# Patient Record
Sex: FEMALE | Race: WHITE | NOT HISPANIC OR LATINO
[De-identification: names, ages, dates, MRNs, and addresses within clinical notes are randomized per-mention and may not be internally consistent; named-entity substitution may affect disease eponyms.]

---

## 2022-07-07 ENCOUNTER — NON-APPOINTMENT (OUTPATIENT)
Age: 32
End: 2022-07-07

## 2022-07-08 ENCOUNTER — APPOINTMENT (OUTPATIENT)
Dept: INTERNAL MEDICINE | Facility: CLINIC | Age: 32
End: 2022-07-08

## 2022-07-08 ENCOUNTER — TRANSCRIPTION ENCOUNTER (OUTPATIENT)
Age: 32
End: 2022-07-08

## 2022-07-08 ENCOUNTER — LABORATORY RESULT (OUTPATIENT)
Age: 32
End: 2022-07-08

## 2022-07-08 VITALS
DIASTOLIC BLOOD PRESSURE: 68 MMHG | OXYGEN SATURATION: 99 % | BODY MASS INDEX: 23.57 KG/M2 | SYSTOLIC BLOOD PRESSURE: 101 MMHG | TEMPERATURE: 99 F | HEART RATE: 68 BPM | WEIGHT: 133 LBS | HEIGHT: 63 IN

## 2022-07-08 DIAGNOSIS — R10.9 UNSPECIFIED ABDOMINAL PAIN: ICD-10-CM

## 2022-07-08 DIAGNOSIS — Z00.00 ENCOUNTER FOR GENERAL ADULT MEDICAL EXAMINATION W/OUT ABNORMAL FINDINGS: ICD-10-CM

## 2022-07-08 PROCEDURE — 99385 PREV VISIT NEW AGE 18-39: CPT | Mod: 25

## 2022-07-08 PROCEDURE — 36415 COLL VENOUS BLD VENIPUNCTURE: CPT

## 2022-07-17 LAB
APPEARANCE: ABNORMAL
BILIRUBIN URINE: NEGATIVE
BLOOD URINE: NEGATIVE
COLOR: YELLOW
GLUCOSE QUALITATIVE U: NEGATIVE
HIV1+2 AB SPEC QL IA.RAPID: NONREACTIVE
KETONES URINE: NEGATIVE
LEUKOCYTE ESTERASE URINE: NEGATIVE
N GONORRHOEA RRNA SPEC QL NAA+PROBE: NOT DETECTED
NITRITE URINE: NEGATIVE
PH URINE: 8
PROTEIN URINE: ABNORMAL
SOURCE AMPLIFICATION: NORMAL
SPECIFIC GRAVITY URINE: 1.02
UROBILINOGEN URINE: NORMAL

## 2023-11-06 ENCOUNTER — HOSPITAL ENCOUNTER (EMERGENCY)
Facility: CLINIC | Age: 33
Discharge: HOME | End: 2023-11-06
Attending: EMERGENCY MEDICINE | Admitting: EMERGENCY MEDICINE
Payer: COMMERCIAL

## 2023-11-06 VITALS
HEART RATE: 82 BPM | RESPIRATION RATE: 19 BRPM | TEMPERATURE: 98.3 F | OXYGEN SATURATION: 99 % | SYSTOLIC BLOOD PRESSURE: 128 MMHG | DIASTOLIC BLOOD PRESSURE: 80 MMHG

## 2023-11-06 DIAGNOSIS — M43.6 NECK STIFFNESS: ICD-10-CM

## 2023-11-06 LAB
ANION GAP SERPL CALCULATED.3IONS-SCNC: 11 MMOL/L (ref 7–15)
BASOPHILS # BLD AUTO: 0.2 10E3/UL (ref 0–0.2)
BASOPHILS NFR BLD AUTO: 2 %
BUN SERPL-MCNC: 7 MG/DL (ref 6–20)
CALCIUM SERPL-MCNC: 9.5 MG/DL (ref 8.6–10)
CHLORIDE SERPL-SCNC: 102 MMOL/L (ref 98–107)
CREAT SERPL-MCNC: 0.78 MG/DL (ref 0.51–0.95)
CRP SERPL-MCNC: <3 MG/L
DEPRECATED HCO3 PLAS-SCNC: 25 MMOL/L (ref 22–29)
EGFRCR SERPLBLD CKD-EPI 2021: >90 ML/MIN/1.73M2
EOSINOPHIL # BLD AUTO: 0.3 10E3/UL (ref 0–0.7)
EOSINOPHIL NFR BLD AUTO: 3 %
ERYTHROCYTE [DISTWIDTH] IN BLOOD BY AUTOMATED COUNT: 12.6 % (ref 10–15)
ERYTHROCYTE [SEDIMENTATION RATE] IN BLOOD BY WESTERGREN METHOD: 10 MM/HR (ref 0–20)
GLUCOSE SERPL-MCNC: 100 MG/DL (ref 70–99)
HCG SERPL QL: NEGATIVE
HCT VFR BLD AUTO: 41.2 % (ref 35–47)
HGB BLD-MCNC: 13.6 G/DL (ref 11.7–15.7)
IMM GRANULOCYTES # BLD: 0.1 10E3/UL
IMM GRANULOCYTES NFR BLD: 1 %
LYMPHOCYTES # BLD AUTO: 4 10E3/UL (ref 0.8–5.3)
LYMPHOCYTES NFR BLD AUTO: 37 %
MCH RBC QN AUTO: 31.3 PG (ref 26.5–33)
MCHC RBC AUTO-ENTMCNC: 33 G/DL (ref 31.5–36.5)
MCV RBC AUTO: 95 FL (ref 78–100)
MONOCYTES # BLD AUTO: 0.8 10E3/UL (ref 0–1.3)
MONOCYTES NFR BLD AUTO: 7 %
NEUTROPHILS # BLD AUTO: 5.4 10E3/UL (ref 1.6–8.3)
NEUTROPHILS NFR BLD AUTO: 50 %
NRBC # BLD AUTO: 0 10E3/UL
NRBC BLD AUTO-RTO: 0 /100
PLATELET # BLD AUTO: 387 10E3/UL (ref 150–450)
POTASSIUM SERPL-SCNC: 3.8 MMOL/L (ref 3.4–5.3)
RBC # BLD AUTO: 4.34 10E6/UL (ref 3.8–5.2)
SODIUM SERPL-SCNC: 138 MMOL/L (ref 135–145)
WBC # BLD AUTO: 10.8 10E3/UL (ref 4–11)

## 2023-11-06 PROCEDURE — 85025 COMPLETE CBC W/AUTO DIFF WBC: CPT | Performed by: EMERGENCY MEDICINE

## 2023-11-06 PROCEDURE — 36415 COLL VENOUS BLD VENIPUNCTURE: CPT | Performed by: EMERGENCY MEDICINE

## 2023-11-06 PROCEDURE — 84703 CHORIONIC GONADOTROPIN ASSAY: CPT | Performed by: EMERGENCY MEDICINE

## 2023-11-06 PROCEDURE — 85652 RBC SED RATE AUTOMATED: CPT | Performed by: EMERGENCY MEDICINE

## 2023-11-06 PROCEDURE — 99284 EMERGENCY DEPT VISIT MOD MDM: CPT | Performed by: EMERGENCY MEDICINE

## 2023-11-06 PROCEDURE — 99283 EMERGENCY DEPT VISIT LOW MDM: CPT | Performed by: EMERGENCY MEDICINE

## 2023-11-06 PROCEDURE — 80048 BASIC METABOLIC PNL TOTAL CA: CPT | Performed by: EMERGENCY MEDICINE

## 2023-11-06 PROCEDURE — 86140 C-REACTIVE PROTEIN: CPT | Performed by: EMERGENCY MEDICINE

## 2023-11-06 RX ORDER — CYCLOBENZAPRINE HCL 10 MG
10 TABLET ORAL 3 TIMES DAILY PRN
Qty: 20 TABLET | Refills: 0 | Status: SHIPPED | OUTPATIENT
Start: 2023-11-06 | End: 2023-11-13

## 2023-11-06 ASSESSMENT — ACTIVITIES OF DAILY LIVING (ADL)
ADLS_ACUITY_SCORE: 33
ADLS_ACUITY_SCORE: 33

## 2023-11-07 NOTE — ED PROVIDER NOTES
ED Provider Note  [facility]      History     Chief Complaint   Patient presents with    Neck Pain     1 week ago onset of neck pain. Does have full ROM in neck with pain. Denies specific injury. Now has URI symptoms. Pt mentions in triage that she is concerned she could have meningitis.     HPI  Sanjuanita Fry is a 33 year old female who presents with 1 week of neck pain that impacts her paraspinal trapezius muscles in her cervical neck extending up towards her occiput. She does not remember any trauma, whiplash, sleeping in a weird way, or any other inciting event. She denies weakness or numbness. She has felt that she maybe had a fever once in the last week but not measured and not recurrent, and then she had a brief area of erythematous raised rash on her right arm that self resolved after a day. Due to ongoing neck pain for a week with a brief rash and possible fever, she presents with concern for possible meningitis. She denies any vision changes, weakness, numbness, severe headache, or other acute complaints at this time.     Past Medical History  History reviewed. No pertinent past medical history.  History reviewed. No pertinent surgical history.  cyclobenzaprine (FLEXERIL) 10 MG tablet  levonorgestrel (MIRENA) 52 MG (20 mcg/day) IUD      Not on File  Family History  No family history on file.  Social History       Past medical history, past surgical history, medications, allergies, family history, and social history were reviewed with the patient. No additional pertinent items.      A complete review of systems was performed with pertinent positives and negatives noted in the HPI, and all other systems negative.    Physical Exam   BP: 137/78  Pulse: 80  Temp: 98.3  F (36.8  C)  Resp: 20  SpO2: 98 %  Physical Exam  Vitals reviewed.   Constitutional:       Appearance: Normal appearance.   HENT:      Head: Normocephalic and atraumatic.      Mouth/Throat:      Mouth: Mucous  membranes are moist.      Pharynx: Oropharynx is clear.   Eyes:      Extraocular Movements: Extraocular movements intact.      Conjunctiva/sclera: Conjunctivae normal.      Pupils: Pupils are equal, round, and reactive to light.   Cardiovascular:      Rate and Rhythm: Normal rate and regular rhythm.   Pulmonary:      Effort: Pulmonary effort is normal. No respiratory distress.   Abdominal:      Palpations: Abdomen is soft.      Tenderness: There is no abdominal tenderness.   Musculoskeletal:         General: Normal range of motion.      Cervical back: Normal range of motion and neck supple.   Skin:     General: Skin is warm and dry.   Neurological:      General: No focal deficit present.      Mental Status: She is alert and oriented to person, place, and time.      Sensory: No sensory deficit.      Motor: No weakness.      Gait: Gait normal.   Psychiatric:         Mood and Affect: Mood normal.         Behavior: Behavior normal.           ED Course, Procedures, & Data      Procedures                     Results for orders placed or performed during the hospital encounter of 11/06/23   Basic metabolic panel     Status: Abnormal   Result Value Ref Range    Sodium 138 135 - 145 mmol/L    Potassium 3.8 3.4 - 5.3 mmol/L    Chloride 102 98 - 107 mmol/L    Carbon Dioxide (CO2) 25 22 - 29 mmol/L    Anion Gap 11 7 - 15 mmol/L    Urea Nitrogen 7.0 6.0 - 20.0 mg/dL    Creatinine 0.78 0.51 - 0.95 mg/dL    GFR Estimate >90 >60 mL/min/1.73m2    Calcium 9.5 8.6 - 10.0 mg/dL    Glucose 100 (H) 70 - 99 mg/dL   CRP inflammation     Status: Normal   Result Value Ref Range    CRP Inflammation <3.00 <5.00 mg/L   Erythrocyte sedimentation rate auto     Status: Normal   Result Value Ref Range    Erythrocyte Sedimentation Rate 10 0 - 20 mm/hr   HCG qualitative Blood     Status: Normal   Result Value Ref Range    hCG Serum Qualitative Negative Negative   CBC with platelets and differential     Status: None   Result Value Ref Range    WBC  Count 10.8 4.0 - 11.0 10e3/uL    RBC Count 4.34 3.80 - 5.20 10e6/uL    Hemoglobin 13.6 11.7 - 15.7 g/dL    Hematocrit 41.2 35.0 - 47.0 %    MCV 95 78 - 100 fL    MCH 31.3 26.5 - 33.0 pg    MCHC 33.0 31.5 - 36.5 g/dL    RDW 12.6 10.0 - 15.0 %    Platelet Count 387 150 - 450 10e3/uL    % Neutrophils 50 %    % Lymphocytes 37 %    % Monocytes 7 %    % Eosinophils 3 %    % Basophils 2 %    % Immature Granulocytes 1 %    NRBCs per 100 WBC 0 <1 /100    Absolute Neutrophils 5.4 1.6 - 8.3 10e3/uL    Absolute Lymphocytes 4.0 0.8 - 5.3 10e3/uL    Absolute Monocytes 0.8 0.0 - 1.3 10e3/uL    Absolute Eosinophils 0.3 0.0 - 0.7 10e3/uL    Absolute Basophils 0.2 0.0 - 0.2 10e3/uL    Absolute Immature Granulocytes 0.1 <=0.4 10e3/uL    Absolute NRBCs 0.0 10e3/uL   CBC with platelets differential     Status: None    Narrative    The following orders were created for panel order CBC with platelets differential.  Procedure                               Abnormality         Status                     ---------                               -----------         ------                     CBC with platelets and d...[[id number]]                      Final result                 Please view results for these tests on the individual orders.     Medications - No data to display  Labs Ordered and Resulted from Time of ED Arrival to Time of ED Departure   BASIC METABOLIC PANEL - Abnormal       Result Value    Sodium 138      Potassium 3.8      Chloride 102      Carbon Dioxide (CO2) 25      Anion Gap 11      Urea Nitrogen 7.0      Creatinine 0.78      GFR Estimate >90      Calcium 9.5      Glucose 100 (*)    CRP INFLAMMATION - Normal    CRP Inflammation <3.00     ERYTHROCYTE SEDIMENTATION RATE AUTO - Normal    Erythrocyte Sedimentation Rate 10     HCG QUALITATIVE PREGNANCY - Normal    hCG Serum Qualitative Negative     CBC WITH PLATELETS AND DIFFERENTIAL    WBC Count 10.8      RBC Count 4.34      Hemoglobin 13.6      Hematocrit 41.2      MCV 95       MCH 31.3      MCHC 33.0      RDW 12.6      Platelet Count 387      % Neutrophils 50      % Lymphocytes 37      % Monocytes 7      % Eosinophils 3      % Basophils 2      % Immature Granulocytes 1      NRBCs per 100 WBC 0      Absolute Neutrophils 5.4      Absolute Lymphocytes 4.0      Absolute Monocytes 0.8      Absolute Eosinophils 0.3      Absolute Basophils 0.2      Absolute Immature Granulocytes 0.1      Absolute NRBCs 0.0       No orders to display          Critical care was not performed.     Medical Decision Making  The patient's presentation was of moderate complexity (an undiagnosed new problem with uncertain diagnosis).    The patient's evaluation involved:  ordering and/or review of 3+ test(s) in this encounter (see separate area of note for details)    The patient's management necessitated only low risk treatment.    Assessment & Plan       Sanjuanita Fry is a 33 year old female who presents with 1 week of neck pain that impacts her paraspinal trapezius muscles in her cervical neck extending up towards her occiput.  Patient is nontoxic on exam, his vital signs within normal limits including normal temperature at 98.3 Fahrenheit without use of antipyretics.  She has no meningismus on exam.  Clinically this is unlikely to represent meningitis and I discussed with patient that I do not think lumbar puncture would be warranted at this time when considering low pretest probability, and risk to benefit ratio of LP in the setting of a low pretest probability.  Patient expressed understanding.  We did draw basic labs including pregnancy test and inflammatory markers, as well as CBC, all of which were unremarkable.  On exam patient's pain is at her lower occiput and worse with moving neck left and right. No pain with chin-to-chest or true meningismus.  No brudzinski or Kernig sign.  No neuro deficits by history or on exam to suggest meningitis or cervical radiculopathy or cervical cord pathology.     Overall  patient with a reassuring evaluation. Suspect musculoskeletal neck pain and patient was prescribed flexeril PRN. Discharged with outpatient follow up and return precautions.     I have reviewed the nursing notes. I have reviewed the findings, diagnosis, plan and need for follow up with the patient.    Discharge Medication List as of 11/6/2023 10:41 PM        START taking these medications    Details   cyclobenzaprine (FLEXERIL) 10 MG tablet Take 1 tablet (10 mg) by mouth 3 times daily as needed for muscle spasms or other (neck pain), Disp-20 tablet, R-0, Local Print             Final diagnoses:   Neck stiffness       Tapan Gardiner MD  [facility] EMERGENCY DEPARTMENT  11/6/2023     Tapan Gardiner MD  11/08/23 0100

## 2023-11-07 NOTE — ED NOTES
Pt received discharge paperwork and script to take home, pt had no further questions for the RN of MD, vitals taken, pt ambulated out of the ED, pt stated they were able to get a ride to take them home

## 2024-07-09 ENCOUNTER — OFFICE VISIT (OUTPATIENT)
Dept: FAMILY MEDICINE | Facility: CLINIC | Age: 34
End: 2024-07-09
Payer: COMMERCIAL

## 2024-07-09 VITALS
WEIGHT: 132 LBS | HEART RATE: 63 BPM | BODY MASS INDEX: 23.39 KG/M2 | OXYGEN SATURATION: 99 % | TEMPERATURE: 98.2 F | SYSTOLIC BLOOD PRESSURE: 105 MMHG | DIASTOLIC BLOOD PRESSURE: 69 MMHG | HEIGHT: 63 IN

## 2024-07-09 DIAGNOSIS — Z00.00 PREVENTATIVE HEALTH CARE: Primary | ICD-10-CM

## 2024-07-09 DIAGNOSIS — Z31.69 PRE-CONCEPTION COUNSELING: ICD-10-CM

## 2024-07-09 SDOH — HEALTH STABILITY: PHYSICAL HEALTH: ON AVERAGE, HOW MANY MINUTES DO YOU ENGAGE IN EXERCISE AT THIS LEVEL?: 30 MIN

## 2024-07-09 SDOH — HEALTH STABILITY: PHYSICAL HEALTH: ON AVERAGE, HOW MANY DAYS PER WEEK DO YOU ENGAGE IN MODERATE TO STRENUOUS EXERCISE (LIKE A BRISK WALK)?: 5 DAYS

## 2024-07-09 ASSESSMENT — SOCIAL DETERMINANTS OF HEALTH (SDOH): HOW OFTEN DO YOU GET TOGETHER WITH FRIENDS OR RELATIVES?: THREE TIMES A WEEK

## 2024-07-09 NOTE — PATIENT INSTRUCTIONS
There is no perfect time for a baby     One example  If the the goal is April baby  - pull the IUD about Dec  - start prenatal in April  - stop using condoms late June       But also - could just a prenatal .And pull the IUD       When you pull the IUD, your periods are most likely to be irregular.  Okay to conceive then - but if you don't get a period, take a pregnancy test every month

## 2024-07-09 NOTE — PROGRESS NOTES
Preventive Care Visit  [facility]  Leslye Rosado MD, Family Medicine  Jul 9, 2024      Assessment & Plan   Sanjuanita Fry is a 33 year old year old female who presents to clinic today to establish care and for a physical.    Preventative health care  Will get outside records.  Most likely due for pap smear - also considering IUD removal - so would remove IUD and do pap smear at same visit.  Commended healthy diet and exercise.    Preconception counseling  - start prenatal vitamin ideally 3-4 months before trying to conceive. Discussed timing and general health, as well as early pregnancy recommendations         Subjective   Sanjuanita is a 33 year old, presenting for the following:  Establish Care and Physical       Health Care Directive  Patient does not have a Health Care Directive or Living Will: Discussed advance care planning with patient; information given to patient to review.    HPI    Generally healthy, no concerns, establishing care   - last pap about 3 years ago    Pregnancy planning  - Mirena placed either 2020 or 2021  - newly , thinking about timing of pregnancies             7/9/2024   General Health   How would you rate your overall physical health? Excellent   Feel stress (tense, anxious, or unable to sleep) Not at all            7/9/2024   Nutrition   Three or more servings of calcium each day? Yes   Diet: Regular (no restrictions)   How many servings of fruit and vegetables per day? 4 or more   How many sweetened beverages each day? 0-1            7/9/2024   Exercise   Days per week of moderate/strenous exercise 5 days   Average minutes spent exercising at this level 30 min            7/9/2024   Social Factors   Frequency of gathering with friends or relatives Three times a week   Worry food won't last until get money to buy more No    No   Food not last or not have enough money for food? No    No   Do you have housing? (Housing is defined as stable permanent housing and does not  include staying ouside in a car, in a tent, in an abandoned building, in an overnight shelter, or couch-surfing.) Yes    Yes   Are you worried about losing your housing? No    No   Lack of transportation? No    No   Unable to get utilities (heat,electricity)? No    No       Multiple values from one day are sorted in reverse-chronological order         7/9/2024   Dental   Dentist two times every year? Yes            7/9/2024   TB Screening   Were you born outside of the [place]? No            Today's PHQ-2 Score:       7/9/2024     1:56 PM   PHQ-2 ( 1999 [employer])   Q1: Little interest or pleasure in doing things 0   Q2: Feeling down, depressed or hopeless 0   PHQ-2 Score 0   Q1: Little interest or pleasure in doing things Not at all   Q2: Feeling down, depressed or hopeless Not at all   PHQ-2 Score 0           7/9/2024   Substance Use   Alcohol more than 3/day or more than 7/wk No   Do you use any other substances recreationally? (!) ALCOHOL        Social History     Tobacco Use    Smoking status: Never    Smokeless tobacco: Never   Substance Use Topics    Alcohol use: Yes     Alcohol/week: 1.0 - 3.0 standard drink of alcohol     Types: 1 - 3 Standard drinks or equivalent per week                 7/9/2024   One time HIV Screening   Previous HIV test? Yes          7/9/2024   STI Screening   New sexual partner(s) since last STI/HIV test? No        History of abnormal Pap smear: No - age 30- 64 PAP with HPV every 5 years recommended             7/9/2024   Contraception/Family Planning   Questions about contraception or family planning (!) YES see HPI        Reviewed and updated as needed this visit by Provider                    History reviewed. No pertinent past medical history.    Past Surgical History:   Procedure Laterality Date    WISDOM TOOTH EXTRACTION       Family History   Problem Relation Age of Onset    Hypertension Mother     Heart Disease Father     Diabetes Type 2  Father     Glaucoma Father     Diabetes Type 2  " Maternal Grandmother     Hypertension Maternal Grandmother     Prostate Cancer Maternal Grandfather     Emphysema Paternal Grandmother     Breast Cancer Maternal Aunt         30s    Pancreatic Cancer Maternal Aunt     Other - See Comments Maternal Aunt         something precancerous in uterus            Objective    Exam  /69   Pulse 63   Temp 98.2  F (36.8  C)   Ht 1.61 m (5' 3.39\")   Wt 59.9 kg (132 lb)   SpO2 99%   BMI 23.10 kg/m     Estimated body mass index is 23.1 kg/m  as calculated from the following:    Height as of this encounter: 1.61 m (5' 3.39\").    Weight as of this encounter: 59.9 kg (132 lb).    Physical Exam  GENERAL: alert and no distress  RESP: lungs clear to auscultation - no rales, rhonchi or wheezes  CV: regular rate and rhythm, normal S1 S2, no S3 or S4, no murmur, click or rub, no peripheral edema  MS: no gross musculoskeletal defects noted, no edema        Signed Electronically by: Leslye Rosado MD    "

## 2024-07-17 ENCOUNTER — OFFICE VISIT (OUTPATIENT)
Dept: MIDWIFE SERVICES | Facility: CLINIC | Age: 34
End: 2024-07-17
Payer: COMMERCIAL

## 2024-07-17 VITALS
HEART RATE: 78 BPM | BODY MASS INDEX: 23.39 KG/M2 | SYSTOLIC BLOOD PRESSURE: 112 MMHG | OXYGEN SATURATION: 100 % | DIASTOLIC BLOOD PRESSURE: 72 MMHG | HEIGHT: 63 IN | WEIGHT: 132 LBS

## 2024-07-17 DIAGNOSIS — Z12.4 SCREENING FOR MALIGNANT NEOPLASM OF CERVIX: ICD-10-CM

## 2024-07-17 DIAGNOSIS — Z11.8 SPECIAL SCREENING EXAMINATION FOR CHLAMYDIAL DISEASE: ICD-10-CM

## 2024-07-17 DIAGNOSIS — Z11.3 SCREENING FOR GONORRHEA: ICD-10-CM

## 2024-07-17 DIAGNOSIS — Z01.419 ENCOUNTER FOR GYNECOLOGICAL EXAMINATION WITHOUT ABNORMAL FINDING: Primary | ICD-10-CM

## 2024-07-17 PROCEDURE — 99459 PELVIC EXAMINATION: CPT | Performed by: ADVANCED PRACTICE MIDWIFE

## 2024-07-17 PROCEDURE — 87591 N.GONORRHOEAE DNA AMP PROB: CPT | Performed by: ADVANCED PRACTICE MIDWIFE

## 2024-07-17 PROCEDURE — 99203 OFFICE O/P NEW LOW 30 MIN: CPT | Performed by: ADVANCED PRACTICE MIDWIFE

## 2024-07-17 PROCEDURE — G0145 SCR C/V CYTO,THINLAYER,RESCR: HCPCS | Performed by: ADVANCED PRACTICE MIDWIFE

## 2024-07-17 PROCEDURE — 87491 CHLMYD TRACH DNA AMP PROBE: CPT | Performed by: ADVANCED PRACTICE MIDWIFE

## 2024-07-17 PROCEDURE — 87624 HPV HI-RISK TYP POOLED RSLT: CPT | Performed by: ADVANCED PRACTICE MIDWIFE

## 2024-07-17 NOTE — PROGRESS NOTES
"S: Sanjuanita is a nulliparous woman, here for her PAP smear. She had a recent annual exam, but is considering pregnancy in the next year or so, so wanted to establish with an OB/GYN clinic as well. She has an IUD in place now, and is not considering removal at this time. No history of abnormal PAPs.     O:/72   Pulse 78   Ht 1.61 m (5' 3.39\")   Wt 59.9 kg (132 lb)   SpO2 100%   BMI 23.10 kg/m    Exam:  Constitutional: healthy, alert, and no distress  Psychiatric: mentation appears normal and affect normal/bright  Pelvic Exam:  Vulva: No external lesions, normal hair distribution, no adenopathy  Vagina: Moist, pink, no abnormal discharge, well rugated, no lesions  Cervix: Pap smear is taken, parous, smooth, pink, no visible lesions; IUD strings NOT visualized.  GC/CT: pending    A/P:  (Z01.419) Encounter for gynecological examination without abnormal finding  (primary encounter diagnosis)    (Z12.4) Screening for malignant neoplasm of cervix  Plan: Gynecologic Cytology (Pap) and HPV -         Recommended Age 30-65 Years    (Z11.3) Screening for gonorrhea  Plan: Chlamydia & Gonorrhea by PCR, [facility] -         Clinic Collect    (Z11.8) Special screening examination for chlamydial disease  Plan: Chlamydia & Gonorrhea by PCR, [facility] -         Clinic Collect    Discussed that it is hard to know when her cycle will return after removal of her IUD.  Shown pregnancy wheel, and discussed how to use it to help with planning pregnancy timing.    Chepe Ryder CNM        "

## 2024-07-18 LAB
C TRACH DNA SPEC QL PROBE+SIG AMP: NEGATIVE
HPV HR 12 DNA CVX QL NAA+PROBE: NEGATIVE
HPV16 DNA CVX QL NAA+PROBE: NEGATIVE
HPV18 DNA CVX QL NAA+PROBE: NEGATIVE
HUMAN PAPILLOMA VIRUS FINAL DIAGNOSIS: NORMAL
N GONORRHOEA DNA SPEC QL NAA+PROBE: NEGATIVE

## 2024-07-23 LAB
BKR LAB AP GYN ADEQUACY: NORMAL
BKR LAB AP GYN INTERPRETATION: NORMAL
BKR LAB AP PREVIOUS ABNORMAL: NORMAL
PATH REPORT.COMMENTS IMP SPEC: NORMAL
PATH REPORT.COMMENTS IMP SPEC: NORMAL
PATH REPORT.RELEVANT HX SPEC: NORMAL

## 2024-09-01 ENCOUNTER — OFFICE VISIT (OUTPATIENT)
Dept: URGENT CARE | Facility: URGENT CARE | Age: 34
End: 2024-09-01
Payer: COMMERCIAL

## 2024-09-01 VITALS
TEMPERATURE: 99.1 F | DIASTOLIC BLOOD PRESSURE: 84 MMHG | OXYGEN SATURATION: 99 % | HEIGHT: 63 IN | HEART RATE: 72 BPM | BODY MASS INDEX: 23.92 KG/M2 | SYSTOLIC BLOOD PRESSURE: 127 MMHG | WEIGHT: 135 LBS

## 2024-09-01 DIAGNOSIS — H69.90 DYSFUNCTION OF EUSTACHIAN TUBE, UNSPECIFIED LATERALITY: Primary | ICD-10-CM

## 2024-09-01 PROCEDURE — 99203 OFFICE O/P NEW LOW 30 MIN: CPT | Performed by: FAMILY MEDICINE

## 2024-09-01 NOTE — PROGRESS NOTES
Subjective: Patient had a cold in July and ever since then has been congested in her ears and her nose, and now it is merging with her fall allergies and she is frequently uncomfortable.  She remembers having eustachian tube dysfunction when she was younger.  Incidentally she tells me that a lot of her medical records are of another patient that somehow got merged with hers and she will keep trying to get that fixed.    Objective: ENT is normal.  The TMs look perfect.  The throat is normal.  The neck is normal.    Assessment and plan: Undoubtedly this is eustachian tube dysfunction.  I think a combination of Mucinex and Flonase can help but she has to be really patient with the Flonase because it takes a few weeks before the effect hits and she needs to stick with it for a full month.  If things are not getting better, she should probably see an ENT doctor.

## 2024-09-18 ENCOUNTER — OFFICE VISIT (OUTPATIENT)
Dept: OPTOMETRY | Facility: CLINIC | Age: 34
End: 2024-09-18
Payer: COMMERCIAL

## 2024-09-18 DIAGNOSIS — H52.229 MYOPIA WITH REGULAR ASTIGMATISM: Primary | ICD-10-CM

## 2024-09-18 DIAGNOSIS — H52.10 MYOPIA WITH REGULAR ASTIGMATISM: Primary | ICD-10-CM

## 2024-09-18 ASSESSMENT — TONOMETRY
OD_IOP_MMHG: 20
OS_IOP_MMHG: 20
IOP_METHOD: ICARE

## 2024-09-18 ASSESSMENT — CONF VISUAL FIELD
OS_NORMAL: 1
OD_INFERIOR_TEMPORAL_RESTRICTION: 0
OD_INFERIOR_NASAL_RESTRICTION: 0
OS_INFERIOR_NASAL_RESTRICTION: 0
OD_SUPERIOR_NASAL_RESTRICTION: 0
OS_SUPERIOR_NASAL_RESTRICTION: 0
METHOD: COUNTING FINGERS
OD_NORMAL: 1
OS_INFERIOR_TEMPORAL_RESTRICTION: 0
OD_SUPERIOR_TEMPORAL_RESTRICTION: 0
OS_SUPERIOR_TEMPORAL_RESTRICTION: 0

## 2024-09-18 ASSESSMENT — REFRACTION_MANIFEST
OS_SPHERE: -2.25
OS_AXIS: 075
OD_SPHERE: -3.75
OS_CYLINDER: +1.25
OD_AXIS: 106
OD_CYLINDER: +0.75

## 2024-09-18 ASSESSMENT — REFRACTION_CURRENTRX
OD_DIAMETER: 14.5
OD_AXIS: 020
OD_BRAND: COOPER BIOFINITY TORIC BC 8.7, D 14.5
OD_BASECURVE: 8.7
OS_DIAMETER: 14.5
OS_SPHERE: -1.00
OS_BRAND: COOPER BIOFINITY TORIC BC 8.7, D 14.5
OD_SPHERE: -2.50
OS_BASECURVE: 8.7
OD_CYLINDER: -1.25
OS_CYLINDER: -1.25
OS_AXIS: 170

## 2024-09-18 ASSESSMENT — SLIT LAMP EXAM - LIDS
COMMENTS: NORMAL
COMMENTS: NORMAL

## 2024-09-18 ASSESSMENT — VISUAL ACUITY
CORRECTION_TYPE: CONTACTS
OS_CC+: -1
OS_CC: 20/25
METHOD: SNELLEN - LINEAR
OD_CC: 20/20

## 2024-09-18 ASSESSMENT — EXTERNAL EXAM - RIGHT EYE: OD_EXAM: NORMAL

## 2024-09-18 ASSESSMENT — REFRACTION_WEARINGRX
OD_CYLINDER: +1.25
OS_SPHERE: -2.25
OS_CYLINDER: +1.25
OD_AXIS: 110
SPECS_TYPE: SVL
OS_AXIS: 080
OD_SPHERE: -3.75

## 2024-09-18 ASSESSMENT — CUP TO DISC RATIO
OS_RATIO: 0.35
OD_RATIO: 0.35

## 2024-09-18 ASSESSMENT — EXTERNAL EXAM - LEFT EYE: OS_EXAM: NORMAL

## 2024-09-18 NOTE — PATIENT INSTRUCTIONS
Artificial tears: (Water-like consistency. Can be used during the daytime)  -Refresh Plus  -Refresh Optive  -Refresh Relieva  -Systane Ultra  -Systane Complete  -Systane Hydration  -Biotrue Hydration Boost  -Blink  (Notes: Anything in a bottle has preservatives and can be used up to 4x/day. Preservative free vials can be used as much as necessary)

## 2024-09-18 NOTE — NURSING NOTE
Chief Complaints and History of Present Illnesses   Patient presents with    Annual Eye Exam     Pt here for new adult annual eye exam.     Chief Complaint(s) and History of Present Illness(es)       Annual Eye Exam              Laterality: both eyes    Comments: Pt here for new adult annual eye exam.              Comments    Pt last eye exam was in 2022. Pt has largely unchanged vision since last exam. Needs contacts updated.     Mindy Jack, COT on 9/18/2024 at 10:55 AM

## 2024-09-18 NOTE — PROGRESS NOTES
A/P  1.) Myopia/Astigmatism OU  -Stable spec Rx, doing well in current glasses and CL's  -Occasional dryness - continue AT  -Dilated ocular health unremarkable OU    Monitor 1-2 years comprehensive, sooner prn. She would like annual supply of CL's ordered and mailed    I have confirmed the patient's CC, HPI and reviewed Past Medical History, Past Surgical History, Social History, Family History, Problem List, Medication List and agree with Tech note.     Georgina Toscano, OD FAAO [facility]SLS    Contact Lens Billing  V-Code:  - Soft toric  Final Contact Lens Rx         Brand Base Curve Diameter Sphere Cylinder Axis    Right Biofinity Toric 8.7 14.5 -2.50 -1.25 020    Left Biofinity Toric 8.7 14.5 -1.00 -1.25 170      Expiration Date: 9/18/26    Replacement: Monthly    Solutions: Multipurpose    Wearing Schedule: Daily Wear           Fait order mailed direct: [id number]  # of units: 4 boxes  Price per Unit: $75 per box  Free shipping annual supply    These are for cosmetic contact lenses.    Encounter Diagnosis   Name Primary?    Myopia with regular astigmatism Yes

## 2024-12-08 NOTE — PROGRESS NOTES
Assessment & Plan   Sanjuanita Fry is a 34 year old year old female who presents to clinic today for IUD removal, but I was unable to visualize IUD strings today for removal    Intrauterine contraceptive device threads lost, initial encounter  Referring for IUD removal with lost string   - Ob/Gyn  Referral    Encounter for preconception consultation  - discussed linking prenatal vitamin to teeth brushing or other daily activity  - discussed early pregnancy care          Subjective   Sanjuanita is a 34 year old, presenting for the following health issues:  Gyn Exam (IUD removal and Pap)    HPI   IUD removal  - desires pregnancy   - not getting a period with her IUD   - taking prenatal vitamin a few a times a week   - planning to try to conceive in the spring     Prevention  - patient had forgotten that she had her pap done last summer         Objective    /68 (BP Location: Left arm, Patient Position: Sitting, Cuff Size: Adult Regular)   Pulse 65   Temp 98.3  F (36.8  C) (Skin)   Resp 14   Wt 64 kg (141 lb)   SpO2 99%   BMI 24.98 kg/m    There is no height or weight on file to calculate BMI.  Physical Exam   GENERAL: alert and no distress   (female) normal female external genitalia, normal urethral meatus, normal vaginal mucosa, normal cervix.  IUD strings not visible.  Tried cytobrush - could not locate strings  MS: no gross musculoskeletal defects noted, no edema          Signed Electronically by: Leslye Rosado MD

## 2024-12-09 ENCOUNTER — OFFICE VISIT (OUTPATIENT)
Dept: OBGYN | Facility: CLINIC | Age: 34
End: 2024-12-09
Payer: COMMERCIAL

## 2024-12-09 ENCOUNTER — OFFICE VISIT (OUTPATIENT)
Dept: FAMILY MEDICINE | Facility: CLINIC | Age: 34
End: 2024-12-09
Payer: COMMERCIAL

## 2024-12-09 VITALS
BODY MASS INDEX: 24.98 KG/M2 | WEIGHT: 141 LBS | TEMPERATURE: 98.3 F | SYSTOLIC BLOOD PRESSURE: 111 MMHG | HEART RATE: 65 BPM | RESPIRATION RATE: 14 BRPM | DIASTOLIC BLOOD PRESSURE: 68 MMHG | OXYGEN SATURATION: 99 %

## 2024-12-09 VITALS
BODY MASS INDEX: 24.91 KG/M2 | SYSTOLIC BLOOD PRESSURE: 111 MMHG | OXYGEN SATURATION: 98 % | TEMPERATURE: 97.5 F | WEIGHT: 140.6 LBS | DIASTOLIC BLOOD PRESSURE: 68 MMHG | HEART RATE: 64 BPM

## 2024-12-09 DIAGNOSIS — Z30.432 ENCOUNTER FOR REMOVAL OF INTRAUTERINE CONTRACEPTIVE DEVICE: Primary | ICD-10-CM

## 2024-12-09 DIAGNOSIS — Z31.69 ENCOUNTER FOR PRECONCEPTION CONSULTATION: ICD-10-CM

## 2024-12-09 DIAGNOSIS — T83.32XA INTRAUTERINE CONTRACEPTIVE DEVICE THREADS LOST, INITIAL ENCOUNTER: ICD-10-CM

## 2024-12-09 DIAGNOSIS — T83.32XA INTRAUTERINE CONTRACEPTIVE DEVICE THREADS LOST, INITIAL ENCOUNTER: Primary | ICD-10-CM

## 2024-12-09 PROCEDURE — 58301 REMOVE INTRAUTERINE DEVICE: CPT | Performed by: OBSTETRICS & GYNECOLOGY

## 2024-12-09 ASSESSMENT — ANXIETY QUESTIONNAIRES
3. WORRYING TOO MUCH ABOUT DIFFERENT THINGS: NOT AT ALL
1. FEELING NERVOUS, ANXIOUS, OR ON EDGE: NOT AT ALL
6. BECOMING EASILY ANNOYED OR IRRITABLE: NOT AT ALL
2. NOT BEING ABLE TO STOP OR CONTROL WORRYING: NOT AT ALL
2. NOT BEING ABLE TO STOP OR CONTROL WORRYING: NOT AT ALL
5. BEING SO RESTLESS THAT IT IS HARD TO SIT STILL: NOT AT ALL
7. FEELING AFRAID AS IF SOMETHING AWFUL MIGHT HAPPEN: NOT AT ALL
IF YOU CHECKED OFF ANY PROBLEMS ON THIS QUESTIONNAIRE, HOW DIFFICULT HAVE THESE PROBLEMS MADE IT FOR YOU TO DO YOUR WORK, TAKE CARE OF THINGS AT HOME, OR GET ALONG WITH OTHER PEOPLE: NOT DIFFICULT AT ALL
IF YOU CHECKED OFF ANY PROBLEMS ON THIS QUESTIONNAIRE, HOW DIFFICULT HAVE THESE PROBLEMS MADE IT FOR YOU TO DO YOUR WORK, TAKE CARE OF THINGS AT HOME, OR GET ALONG WITH OTHER PEOPLE: NOT DIFFICULT AT ALL
GAD7 TOTAL SCORE: 0
1. FEELING NERVOUS, ANXIOUS, OR ON EDGE: NOT AT ALL
3. WORRYING TOO MUCH ABOUT DIFFERENT THINGS: NOT AT ALL
7. FEELING AFRAID AS IF SOMETHING AWFUL MIGHT HAPPEN: NOT AT ALL
6. BECOMING EASILY ANNOYED OR IRRITABLE: NOT AT ALL
GAD7 TOTAL SCORE: 0
5. BEING SO RESTLESS THAT IT IS HARD TO SIT STILL: NOT AT ALL
GAD7 TOTAL SCORE: 0

## 2024-12-09 ASSESSMENT — PATIENT HEALTH QUESTIONNAIRE - PHQ9
SUM OF ALL RESPONSES TO PHQ QUESTIONS 1-9: 0
5. POOR APPETITE OR OVEREATING: NOT AT ALL
5. POOR APPETITE OR OVEREATING: NOT AT ALL

## 2024-12-09 NOTE — PROGRESS NOTES
I was asked to see Sanjuanita Fry by Leslye Elizondo  for consultation regarding Lost IUD strings     HPI:  Sanjuanita Fry is a 34 year old female here for a consultation regarding: IUD removal     Was seen at the [facility] for IUD removal. They could not see the strings so she was referred here    This is the 4th yr of her second mirena IUD and she has not had a period since 2016 due to her mirena     Would just like to have the IUD removed today.   Does not want another as she is considering starting a familuy.     Relationship: Long-term, stable relationship      No LMP recorded (lmp unknown). (Menstrual status: IUD).         No past medical history on file.    Past Surgical History:   Procedure Laterality Date    WISDOM TOOTH EXTRACTION         Family History   Problem Relation Age of Onset    Hypertension Mother     Heart Disease Father     Diabetes Type 2  Father     Glaucoma Father     Diabetes Type 2  Maternal Grandmother     Hypertension Maternal Grandmother     Prostate Cancer Maternal Grandfather     Emphysema Paternal Grandmother     Breast Cancer Maternal Aunt         30s    Pancreatic Cancer Maternal Aunt     Other - See Comments Maternal Aunt         something precancerous in uterus         Medications:    Current Outpatient Medications:     Prenatal Vit-Fe Fumarate-FA (PRENATAL PO), , Disp: , Rfl:     levonorgestrel (MIRENA) 52 MG (20 mcg/day) IUD, by Intrauterine route once (Patient not taking: Reported on 12/9/2024), Disp: , Rfl:     Allergies:  Patient has no known allergies.    ROS:  She denies abnormal vaginal bleeding, discharge or unusual pelvic pain  EXAM:  /68   Pulse 64   Temp 97.5  F (36.4  C) (Temporal)   Wt 63.8 kg (140 lb 9.6 oz)   LMP  (LMP Unknown)   SpO2 98%   Breastfeeding No   BMI 24.91 kg/m      General - pleasant female in no acute distress.  Neurological - Alert and oriented  Psych:  normal mood and affect.  normal respiratory and cardiovascular  effort   Breast - deferred.  Pelvic:  EG - normal adult female.  BUS - within normal limits.  Vagina - well rugated, no discharge.  Cervix - no lesions, no CMT.    IUD strings  were not visualized.     Procedure:  After  consent was obtained from the patient, the cervix was prepped with bedadine and using the IUD forceps, the strings were grasped from up within the cervix and brought down past the external os.  The IUD did not come easily with traction using a ring forceps.  The cervix was then dilated with a disposable os finder.  After that an attempt to remove the IUD was successful.  IUD removed intact with a few seconds of moderate patient discomfort.  She tolerated it very well however a few minutes later started to feel faint.  She was given a cool pack on her forehead and called apple juice.  She felt much better after a couple minutes.  There were no complications, minimal bleeding        ASSESSMENT:/PLAN:  (Z30.432) Encounter for removal of intrauterine contraceptive device  (primary encounter diagnosis)  Comment: Removed successfully after dilating the cervix  Plan: follow-up prn     (T83.32XA) Intrauterine contraceptive device threads lost, initial encounter  Comment: IUD strings found curled up within the endocervical canal  Plan: IUD successfully removed.  Patient declines operative contraception     Tori Farrar MD

## 2025-05-28 ENCOUNTER — VIRTUAL VISIT (OUTPATIENT)
Dept: OBGYN | Facility: CLINIC | Age: 35
End: 2025-05-28

## 2025-05-28 VITALS — BODY MASS INDEX: 24.91 KG/M2 | HEIGHT: 63 IN

## 2025-05-28 DIAGNOSIS — Z23 NEED FOR DIPHTHERIA-TETANUS-PERTUSSIS (TDAP) VACCINE: ICD-10-CM

## 2025-05-28 DIAGNOSIS — Z34.00 ENCOUNTER FOR SUPERVISION OF NORMAL FIRST PREGNANCY: Primary | ICD-10-CM

## 2025-05-28 PROCEDURE — 99207 PR NO CHARGE NURSE ONLY: CPT | Mod: 93

## 2025-05-28 NOTE — PROGRESS NOTES
Important Information for Provider:     New ob nurse intake by phone, first pregnancy. Patient had IUD removed 12/2024, normal periods after removal.  Handouts reviewed. Discussed genetic screening. Medication list sent via my chart  Recommended B6, Unisom for nausea  Patient is running in [place] [employer] 6/2025. She has been a runner for years. Precautions given  Ultrasound and NOB with Cele 6/22/2025       Caffeine intake/servings daily - 1  Calcium intake/servings daily - 3  Exercise 5 times weekly - describe ; runs marathons   Sunscreen used - Yes  Seatbelts used - Yes  Self Breast Exam - Yes  Pap test up to date -  Yes  Dental exam up to date -  Yes  Immunizations reviewed and up to date - Yes  Abuse: Current or Past (Physical, Sexual or Emotional) - No  Do you feel safe in your environment - Yes  Do you cope well with stress - Yes                Prenatal OB Questionnaire  Patient supplied answers from flow sheet for:  Prenatal OB Questionnaire.  Past Medical History  Have you ever recieved care for your mental health? : (Patient-Rptd) No  Have you ever been in a major accident or suffered serious trauma?: (Patient-Rptd) No  Within the last year, has anyone hit, slapped, kicked or otherwise hurt you?: (Patient-Rptd) No  In the last year, has anyone forced you to have sex when you didn't want to?: (Patient-Rptd) No    Past Medical History 2   Have you ever received a blood transfusion?: (Patient-Rptd) No  Would you accept a blood transfusion if was medically recommended?: (Patient-Rptd) Yes  Does anyone in your home smoke?: (Patient-Rptd) No   Is your blood type Rh negative?: (Patient-Rptd) No  Have you ever ?: (Patient-Rptd) No  Have you been hospitalized for a nonsurgical reason excluding normal delivery?: (Patient-Rptd) No  Have you ever had an abnormal pap smear?: (Patient-Rptd) No    Past Medical History (Continued)  Do you have a history of abnormalities of the uterus?: (Patient-Rptd) No  Did  no your mother take PATY or any other hormones when she was pregnant with you?: (Patient-Rptd) No  Do you have any other problems we have not asked about which you feel may be important to this pregnancy?: (Patient-Rptd) No                     Allergies as of 5/28/2025:    Allergies as of 05/28/2025    (No Known Allergies)         Does patient have irregular periods?  No  Did patient use hormonal birth control in the three months prior to positive urine pregnancy test? No  Is the patient breastfeeding?  No  Is the patient 10 weeks or greater at time of education visit?  No

## 2025-06-09 ENCOUNTER — OFFICE VISIT (OUTPATIENT)
Dept: FAMILY MEDICINE | Facility: CLINIC | Age: 35
End: 2025-06-09
Payer: COMMERCIAL

## 2025-06-09 VITALS
HEART RATE: 63 BPM | TEMPERATURE: 97.9 F | HEIGHT: 63 IN | OXYGEN SATURATION: 98 % | BODY MASS INDEX: 24.45 KG/M2 | DIASTOLIC BLOOD PRESSURE: 63 MMHG | WEIGHT: 138 LBS | SYSTOLIC BLOOD PRESSURE: 101 MMHG

## 2025-06-09 DIAGNOSIS — Z34.91 NORMAL PREGNANCY, FIRST TRIMESTER: ICD-10-CM

## 2025-06-09 DIAGNOSIS — O21.9 NAUSEA AND VOMITING DURING PREGNANCY: Primary | ICD-10-CM

## 2025-06-09 RX ORDER — DOXYLAMINE SUCCINATE AND PYRIDOXINE HYDROCHLORIDE 20; 20 MG/1; MG/1
1 TABLET, EXTENDED RELEASE ORAL 2 TIMES DAILY
Qty: 120 TABLET | Refills: 2 | Status: SHIPPED | OUTPATIENT
Start: 2025-06-09

## 2025-06-09 NOTE — PATIENT INSTRUCTIONS
Ideally - doxylamine-pyridoxine ER - 1 tablet at bedtime. Can take a second one in the morning if needed    If that is expensive - buy them separately   - take unisom - doxylamine at night.  25 mg tabs. Start with 1/2.  Can increase to 1 tab     Dose - As a single agent, the typical B6 - pyridoxine dose is 10 to 25 mg orally every six to eight hours, as needed. Where the 10 mg tablet is unavailable, the 25 mg tablet can be used safely (splitting the tablet can be difficult).

## 2025-06-09 NOTE — NURSING NOTE
"Sanjuanita  34 year old    Chief Complaint   Patient presents with    Prenatal Care     Training for a marathon and would like to discuss with provider if it is safe since she is 8 weeks into pregnancy            Blood pressure 101/63, pulse 63, temperature 97.9  F (36.6  C), height 1.6 m (5' 2.99\"), weight 62.6 kg (138 lb), last menstrual period 04/16/2025, SpO2 98%, not currently breastfeeding. Body mass index is 24.45 kg/m .    Patient Active Problem List   Diagnosis    Need for diphtheria-tetanus-pertussis (Tdap) vaccine             Wt Readings from Last 2 Encounters:   06/09/25 62.6 kg (138 lb)   12/09/24 63.8 kg (140 lb 9.6 oz)       BP Readings from Last 3 Encounters:   06/09/25 101/63   12/09/24 111/68   12/09/24 111/68                Current Outpatient Medications   Medication Sig Dispense Refill    Prenatal Vit-Fe Fumarate-FA (PRENATAL PO)        No current facility-administered medications for this visit.             Social History     Tobacco Use    Smoking status: Never    Smokeless tobacco: Never   Vaping Use    Vaping status: Never Used   Substance Use Topics    Alcohol use: Not Currently     Alcohol/week: 1.0 - 3.0 standard drink of alcohol     Types: 1 - 3 Standard drinks or equivalent per week    Drug use: Never             Health Maintenance Due   Topic Date Due    HIV SCREENING  Never done    HEPATITIS C SCREENING  Never done    HEPATITIS B VACCINE (1 of 3 - 19+ 3-dose series) Never done    DTAP/TDAP/TD VACCINE (1 - Tdap) Never done    MATERNAL SCREENING DISCUSSION  06/25/2025    YEARLY PREVENTIVE VISIT  07/09/2025           No results found for: \"PAP\"           June 9, 2025 4:38 PM    "

## 2025-06-09 NOTE — PROGRESS NOTES
Assessment & Plan   Sanjuanita Fry is a 34 year old year old female who presents to clinic today 7w5d pregnant, has OB intake scheduled - and has some questions about pregnancy.      Nausea and vomiting during pregnancy  Agree with plan for small snacks/meals.  Discussed doxylamine and pyridoxine as options.  Will try prescribing combination medication.  If that is expensive - can buy it separately OTC. See AVS for details  - Doxylamine-Pyridoxine ER (BONJESTA) 20-20 MG TBCR; Take 1 tablet by mouth 2 times daily.    Normal pregnancy   - supportive visit   - patient has been able to train successfully for marathon and is feeling good when running.  Was able to complete 20 mile training run.  As long as she feels good, reasonable to complete marathon.  If she is not feeling well - then should not run. Can also bow out at 1/2 marathon or after is she starts not feeling well.   - discussed NIPT vs first trimester screening, second trimester screening labs, and 20 week anatomy scan - patient pro screening, interested in NIPT. Discussed labor pain control options. Discussed routine vaccines in pregnancy.  Reassured her OB will discuss this with her         Subjective   Sanjuanita is a 34 year old, presenting for the following health issues:  Prenatal Care (Training for a marathon and would like to discuss with provider if it is safe since she is 8 weeks into pregnancy )    HPI      Pregnancy  - 7w5d pregnant  - feeling super tired and nausea - really hard to work  - eating more snacks and fewer meals   - has ultrasound scheduled for dating, and OB intake visit  - wondering about prenatal screening, worried about pain control in labor, wondering about vaccines in pregnancy     Training for a marathon  - training for 9th marathon, last one last summer   - did 20 min training run - felt good.  Feels good running   - planning for marathon July 21, can stop after 1/2 marathon if she isn't feeling good     Soc Hx  - just  "bought a house           Objective    /63   Pulse 63   Temp 97.9  F (36.6  C)   Ht 1.6 m (5' 2.99\")   Wt 62.6 kg (138 lb)   LMP 04/16/2025   SpO2 98%   BMI 24.45 kg/m    Body mass index is 24.45 kg/m .  Physical Exam   General: Alert, pleasant and cooperative in no acute distress.    Resp: Breathing comfortably, no respiratory distress, speaking in full sentences   Skin: no jaundice or rashes on visible skin   Psych: well groomed, mood appropriate to content of speech, linear thoughts, no pressured speech        42  minutes spent on the date of the encounter doing chart review, history and exam, documentation and further activities per the note     The longitudinal plan of care for the diagnosis(es)/condition(s) as documented were addressed during this visit. Due to the added complexity in care, I will continue to support Sanjuanita in the subsequent management and with ongoing continuity of care.      Signed Electronically by: Leslye Rosado MD    "

## 2025-06-10 LAB
ABO + RH BLD: NORMAL
BLD GP AB SCN SERPL QL: NEGATIVE
SPECIMEN EXP DATE BLD: NORMAL

## 2025-06-11 ENCOUNTER — RESULTS FOLLOW-UP (OUTPATIENT)
Dept: OBGYN | Facility: CLINIC | Age: 35
End: 2025-06-11

## 2025-06-11 ENCOUNTER — TRANSCRIBE ORDERS (OUTPATIENT)
Dept: MATERNAL FETAL MEDICINE | Facility: CLINIC | Age: 35
End: 2025-06-11

## 2025-06-11 ENCOUNTER — PRENATAL OFFICE VISIT (OUTPATIENT)
Dept: OBGYN | Facility: CLINIC | Age: 35
End: 2025-06-11
Payer: COMMERCIAL

## 2025-06-11 ENCOUNTER — PATIENT OUTREACH (OUTPATIENT)
Dept: ONCOLOGY | Facility: CLINIC | Age: 35
End: 2025-06-11

## 2025-06-11 ENCOUNTER — ANCILLARY PROCEDURE (OUTPATIENT)
Dept: ULTRASOUND IMAGING | Facility: CLINIC | Age: 35
End: 2025-06-11
Payer: COMMERCIAL

## 2025-06-11 VITALS
DIASTOLIC BLOOD PRESSURE: 59 MMHG | WEIGHT: 140.7 LBS | HEART RATE: 74 BPM | OXYGEN SATURATION: 98 % | SYSTOLIC BLOOD PRESSURE: 103 MMHG | BODY MASS INDEX: 24.93 KG/M2

## 2025-06-11 DIAGNOSIS — O21.9 NAUSEA AND VOMITING DURING PREGNANCY: ICD-10-CM

## 2025-06-11 DIAGNOSIS — Z34.91 NORMAL PREGNANCY, FIRST TRIMESTER: Primary | ICD-10-CM

## 2025-06-11 DIAGNOSIS — O26.90 PREGNANCY RELATED CONDITION, ANTEPARTUM: Primary | ICD-10-CM

## 2025-06-11 DIAGNOSIS — Z34.00 ENCOUNTER FOR SUPERVISION OF NORMAL FIRST PREGNANCY: ICD-10-CM

## 2025-06-11 DIAGNOSIS — Z80.8 FAMILY HISTORY OF MALIGNANT NEOPLASM OF OTHER ORGANS OR SYSTEMS: ICD-10-CM

## 2025-06-11 LAB
ALBUMIN UR-MCNC: NEGATIVE MG/DL
APPEARANCE UR: CLEAR
BILIRUB UR QL STRIP: NEGATIVE
COLOR UR AUTO: YELLOW
ERYTHROCYTE [DISTWIDTH] IN BLOOD BY AUTOMATED COUNT: 11.9 % (ref 10–15)
EST. AVERAGE GLUCOSE BLD GHB EST-MCNC: 108 MG/DL
GLUCOSE UR STRIP-MCNC: NEGATIVE MG/DL
HBA1C MFR BLD: 5.4 % (ref 0–5.6)
HBV SURFACE AG SERPL QL IA: NONREACTIVE
HCT VFR BLD AUTO: 36.3 % (ref 35–47)
HCV AB SERPL QL IA: NONREACTIVE
HGB BLD-MCNC: 12.2 G/DL (ref 11.7–15.7)
HGB UR QL STRIP: NEGATIVE
HIV 1+2 AB+HIV1 P24 AG SERPL QL IA: NONREACTIVE
KETONES UR STRIP-MCNC: NEGATIVE MG/DL
LEUKOCYTE ESTERASE UR QL STRIP: NEGATIVE
MCH RBC QN AUTO: 31.3 PG (ref 26.5–33)
MCHC RBC AUTO-ENTMCNC: 33.6 G/DL (ref 31.5–36.5)
MCV RBC AUTO: 93 FL (ref 78–100)
NITRATE UR QL: NEGATIVE
PH UR STRIP: 6.5 [PH] (ref 5–7)
PLATELET # BLD AUTO: 399 10E3/UL (ref 150–450)
RBC # BLD AUTO: 3.9 10E6/UL (ref 3.8–5.2)
RUBV IGG SERPL QL IA: 2.68 INDEX
RUBV IGG SERPL QL IA: POSITIVE
SP GR UR STRIP: <=1.005 (ref 1–1.03)
T PALLIDUM AB SER QL: NONREACTIVE
UROBILINOGEN UR STRIP-ACNC: 0.2 E.U./DL
WBC # BLD AUTO: 11.1 10E3/UL (ref 4–11)

## 2025-06-11 PROCEDURE — 86803 HEPATITIS C AB TEST: CPT

## 2025-06-11 PROCEDURE — 86762 RUBELLA ANTIBODY: CPT

## 2025-06-11 PROCEDURE — 86900 BLOOD TYPING SEROLOGIC ABO: CPT

## 2025-06-11 PROCEDURE — 86850 RBC ANTIBODY SCREEN: CPT

## 2025-06-11 PROCEDURE — G2211 COMPLEX E/M VISIT ADD ON: HCPCS

## 2025-06-11 PROCEDURE — 3074F SYST BP LT 130 MM HG: CPT

## 2025-06-11 PROCEDURE — 83036 HEMOGLOBIN GLYCOSYLATED A1C: CPT

## 2025-06-11 PROCEDURE — 0500F INITIAL PRENATAL CARE VISIT: CPT

## 2025-06-11 PROCEDURE — 87389 HIV-1 AG W/HIV-1&-2 AB AG IA: CPT

## 2025-06-11 PROCEDURE — 3044F HG A1C LEVEL LT 7.0%: CPT

## 2025-06-11 PROCEDURE — 85027 COMPLETE CBC AUTOMATED: CPT

## 2025-06-11 PROCEDURE — 99213 OFFICE O/P EST LOW 20 MIN: CPT

## 2025-06-11 PROCEDURE — 3078F DIAST BP <80 MM HG: CPT

## 2025-06-11 PROCEDURE — 87340 HEPATITIS B SURFACE AG IA: CPT

## 2025-06-11 PROCEDURE — 87086 URINE CULTURE/COLONY COUNT: CPT

## 2025-06-11 PROCEDURE — 86780 TREPONEMA PALLIDUM: CPT

## 2025-06-11 PROCEDURE — 86901 BLOOD TYPING SEROLOGIC RH(D): CPT

## 2025-06-11 PROCEDURE — 81003 URINALYSIS AUTO W/O SCOPE: CPT

## 2025-06-11 PROCEDURE — 36415 COLL VENOUS BLD VENIPUNCTURE: CPT

## 2025-06-11 RX ORDER — METOCLOPRAMIDE 5 MG/1
5 TABLET ORAL
Qty: 60 TABLET | Refills: 3 | Status: SHIPPED | OUTPATIENT
Start: 2025-06-11

## 2025-06-11 RX ORDER — ONDANSETRON 4 MG/1
4 TABLET, FILM COATED ORAL EVERY 8 HOURS PRN
Qty: 30 TABLET | Refills: 3 | Status: SHIPPED | OUTPATIENT
Start: 2025-06-11

## 2025-06-11 NOTE — PROGRESS NOTES
SUBJECTIVE:     HPI:    This is a 34 year old female patient,  who presents for her first obstetrical visit.    MARCI: 2026, by Last Menstrual Period.  She is 8w0d weeks.  Her cycles are regular.  Her last menstrual period was normal.   Since her LMP, she has experienced  nausea and emesis).   She denies vaginal bleeding and constipation.    Additional History: -no pertinent past medical/surgical hx      HISTORY:   Planned Pregnancy: Yes  Marital Status:  Bridger  Occupation: Professor at [employer]  Living in Household: Spouse    Past History:  Her past medical history is non-contributory.      She has a history of  no prior ob hx    Since her last LMP she denies use of alcohol, tobacco and street drugs.    Past medical, surgical, social and family history were reviewed and updated in [facility].      Current Outpatient Medications   Medication Sig Dispense Refill    Doxylamine-Pyridoxine ER (BONJESTA) 20-20 MG TBCR Take 1 tablet by mouth 2 times daily. 120 tablet 2    metoclopramide (REGLAN) 5 MG tablet Take 1 tablet (5 mg) by mouth 4 times daily (before meals and nightly). 60 tablet 3    ondansetron (ZOFRAN) 4 MG tablet Take 1 tablet (4 mg) by mouth every 8 hours as needed for nausea. 30 tablet 3    Prenatal Vit-Fe Fumarate-FA (PRENATAL PO)        No current facility-administered medications for this visit.       ROS:   12 point review of systems negative other than symptoms noted below or in the HPI.      OBJECTIVE:     EXAM:  /59 (BP Location: Left arm, Patient Position: Sitting, Cuff Size: Adult Regular)   Pulse 74   Wt 63.8 kg (140 lb 11.2 oz)   LMP 2025   SpO2 98%   BMI 24.93 kg/m   Body mass index is 24.93 kg/m .    GENERAL: alert and no distress  EYES: Eyes grossly normal to inspection, PERRL and conjunctivae and sclerae normal  NECK: no adenopathy, no asymmetry, masses, or scars  RESP: RRR  CV: regular rate and rhythm,  no peripheral edema  ABDOMEN: soft, nontender, no  hepatosplenomegaly, no masses and bowel sounds normal  MS: no gross musculoskeletal defects noted, no edema  SKIN: no suspicious lesions or rashes  NEURO: Normal strength and tone, mentation intact and speech normal  PSYCH: mentation appears normal, affect normal/bright    ASSESSMENT/PLAN:       ICD-10-CM    1. Nausea and vomiting during pregnancy  O21.9 ondansetron (ZOFRAN) 4 MG tablet     metoclopramide (REGLAN) 5 MG tablet      2. Family history of malignant neoplasm of other organs or systems  Z80.8 Adult Genetics & Metabolism  Referral      3. Normal pregnancy, first trimester  Z34.91 Mat Fetal [facility]tr Referral - Pregnancy     US OB > 14 Weeks          34 year old , 8w0d weeks of pregnancy with MARCI of 2026, by Last Menstrual Period    Discussed as follows:  -nob labs today  -pap utd  -FAS 18-20 ordered  -gc ordered   -flu covid tdap RSV recc in pregnancy  -CNM VS MD  -oriented to PNC and practice locations  [facility]  -family cancer hx- adult genetics ordered and explained- no first deg relative however many on same side of family- can schedule when it works for her- discussed may change screening reccs for breast/colon ca  -running grandma's marathon- discussed hydration, she can stop at any time, will have drinks q2 miles   Counseling given:   - Follow up in 4-6 weeks for return OB visit.  - Recommended weight gain for pregnancy: 25-35       PLAN/PATIENT INSTRUCTIONS:    Rtc 4 wks     DAPHNE Campos CNP

## 2025-06-11 NOTE — PROGRESS NOTES
Writer received Cancer Risk Management Program referral, referred for:    many cancers in maternal family hx, Breast ca age 32, prostate cancer, pancreatic cancer, no testing, no first deg relative      Reviewed for appropriate plan, and sent to New Patient Scheduling for completion.

## 2025-06-12 ENCOUNTER — RESULTS FOLLOW-UP (OUTPATIENT)
Dept: OBGYN | Facility: CLINIC | Age: 35
End: 2025-06-12

## 2025-06-12 LAB — BACTERIA UR CULT: NORMAL

## 2025-07-04 ASSESSMENT — PATIENT HEALTH QUESTIONNAIRE - PHQ9
10. IF YOU CHECKED OFF ANY PROBLEMS, HOW DIFFICULT HAVE THESE PROBLEMS MADE IT FOR YOU TO DO YOUR WORK, TAKE CARE OF THINGS AT HOME, OR GET ALONG WITH OTHER PEOPLE: NOT DIFFICULT AT ALL
SUM OF ALL RESPONSES TO PHQ QUESTIONS 1-9: 0
SUM OF ALL RESPONSES TO PHQ QUESTIONS 1-9: 0

## 2025-07-09 ENCOUNTER — PRENATAL OFFICE VISIT (OUTPATIENT)
Dept: OBGYN | Facility: CLINIC | Age: 35
End: 2025-07-09
Payer: COMMERCIAL

## 2025-07-09 VITALS
TEMPERATURE: 97.3 F | HEART RATE: 60 BPM | BODY MASS INDEX: 25.21 KG/M2 | WEIGHT: 142.3 LBS | OXYGEN SATURATION: 100 % | SYSTOLIC BLOOD PRESSURE: 104 MMHG | DIASTOLIC BLOOD PRESSURE: 65 MMHG | HEIGHT: 63 IN

## 2025-07-09 DIAGNOSIS — Z34.01 SUPERVISION OF NORMAL FIRST PREGNANCY IN FIRST TRIMESTER: Primary | ICD-10-CM

## 2025-07-09 PROCEDURE — 99207 PR PRENATAL VISIT: CPT | Performed by: OBSTETRICS & GYNECOLOGY

## 2025-07-09 PROCEDURE — 0502F SUBSEQUENT PRENATAL CARE: CPT | Performed by: OBSTETRICS & GYNECOLOGY

## 2025-07-09 PROCEDURE — 3078F DIAST BP <80 MM HG: CPT | Performed by: OBSTETRICS & GYNECOLOGY

## 2025-07-09 PROCEDURE — 3074F SYST BP LT 130 MM HG: CPT | Performed by: OBSTETRICS & GYNECOLOGY

## 2025-07-09 NOTE — PATIENT INSTRUCTIONS
Weeks 10 to 14 of Your Pregnancy: Care Instructions  It's now possible to hear the fetus's heartbeat with a special ultrasound device. And the fetus's organs are developing.     It's okay to exercise. Try activities such as walking or swimming. Check with your doctor before starting a new program.     10 things to know for weeks 10 to 14 of pregnancy   You may feel more tired than usual.  Taking naps during the day may help.    You may feel emotional.  It might help to talk to someone.    You may have headaches.  Try lying down and putting a cool cloth over your forehead.    You can use acetaminophen (Tylenol) for pain relief.  Don't take any anti-inflammatory medicines (such as Advil, Motrin, Aleve), unless your doctor says it's okay.    You may feel a fullness or aching in your lower belly.  This can feel like the kind of cramps you might get before a period. A back rub may help.    You may need to urinate more.  Your growing uterus and changing hormones can affect your bladder.    You may feel sick to your stomach (morning sickness).  Try avoiding food and smells that make you feel sick.    Your breasts may feel different.  They may feel tender or get bigger. Your nipples may get darker. Try a bra that gives you good support.    Avoid alcohol, tobacco, and drugs (including marijuana).  If you need help quitting, talk to your doctor.    Take a daily prenatal vitamin.  Choose one with folic acid.   Follow-up care is a key part of your treatment and safety. Be sure to make and go to all appointments, and call your doctor if you are having problems. It's also a good idea to know your test results and keep a list of the medicines you take.  When should you call for help?  Call 911  anytime you think you may need emergency care. For example, call if:  You have severe vaginal bleeding. You have soaked through one or more pads in an hour, and the bleeding is not slowing down.  You have chest pain, are short of breath, or  "cough up blood.  You have sudden, severe pain in your belly that does not go away.  You passed out (lost consciousness).  Call your doctor or midwife now or seek immediate medical care if:  You have a fever.  You have vaginal bleeding.  You are dizzy or lightheaded, or you feel like you may faint.  You have signs of a blood clot in your leg (called a deep vein thrombosis), such as:  Pain in the calf, back of the knee, thigh, or groin.  Swelling in your leg or groin.  A color change on the leg or groin. The skin may be reddish or purplish.  You have symptoms of a urinary tract infection. These may include:  Pain or burning when you urinate.  A frequent need to urinate without being able to pass much urine.  Pain in your low back (below the rib cage and above the waist).  Blood in your urine.  You have belly pain.  You think you are having contractions.  Watch closely for changes in your health, and be sure to contact your doctor or midwife if:  You have vaginal discharge that smells bad.  You feel sad, anxious, or hopeless for more than a few days.  You have other concerns about your pregnancy.  Where can you learn more?  Go to https://www.[employer].com.net/patiented  Enter E090 in the search box to learn more about \"Weeks 10 to 14 of Your Pregnancy: Care Instructions.\"  Current as of: April 30, 2024  Content Version: 14.5    3750-8731 [employer].   Care instructions adapted under license by your healthcare professional. If you have questions about a medical condition or this instruction, always ask your healthcare professional. [employer] disclaims any warranty or liability for your use of this information.      Nutrition During Pregnancy: Care Instructions  Overview     Healthy eating when you are pregnant is important for you and your baby. It can help you feel well and have a successful pregnancy and delivery. During pregnancy your nutrition needs increase. Even if you have excellent eating " habits, your doctor may recommend a multivitamin to make sure you get enough iron and folic acid.  You may wonder how much weight you should gain. In general, if you were at a healthy weight before you became pregnant, then you should gain between 25 and 35 pounds. If you were overweight before pregnancy, then you'll likely be advised to gain 15 to 25 pounds. If you were underweight before pregnancy, then you'll probably be advised to gain 28 to 40 pounds. Your doctor will work with you to set a weight goal that is right for you. Gaining a healthy amount of weight helps you have a healthy baby.  Follow-up care is a key part of your treatment and safety. Be sure to make and go to all appointments, and call your doctor if you are having problems. It's also a good idea to know your test results and keep a list of the medicines you take.  How can you care for yourself at home?  Eat plenty of fruits and vegetables. Include a variety of orange, yellow, and leafy dark-green vegetables every day.  Choose whole-grain bread, cereal, and pasta. Good choices include whole wheat bread, whole wheat pasta, brown rice, and oatmeal.  Get 4 or more servings of milk and milk products each day. Good choices include nonfat or low-fat milk, yogurt, and cheese. If you cannot eat milk products, you can get calcium from calcium-fortified products such as orange juice, soy milk, and tofu. Other non-milk sources of calcium include leafy green vegetables, such as broccoli, kale, mustard greens, turnip greens, bok jeferson, and brussels sprouts.  If you eat meat, pick lower-fat types. Good choices include lean cuts of meat and chicken or turkey without the skin.  Avoid fish that are high in mercury. These include shark, swordfish, justyn mackerel, marlin, orange roughy, and bigeye tuna, as well as tilefish from the [place].  It's okay to eat up to 8 to 12 ounces a week of fish that are low in mercury or up to 4 ounces a week of fish that have  "medium levels of mercury. Some fish that are low in mercury are salmon, shrimp, canned light tuna, cod, and tilapia. Some fish that have medium levels of mercury are halibut and white albacore tuna.  For more advice about eating fish, you can visit the U.S. Food and Drug Administration (FDA) or U.S. Environmental Protection Agency (EPA) website.  Heat lunch meats (such as turkey, ham, or bologna) to 165 F before you eat them. This reduces your risk of getting sick from a kind of bacteria that can be found in lunch meats.  Do not eat unpasteurized soft cheeses, such as brie, feta, fresh mozzarella, and blue cheese. They have a bacteria that could harm your baby.  Limit caffeine to about 200 to 300 mg per day. On average, a cup of brewed coffee has around 80 to 100 mg of caffeine.  Do not drink any alcohol. No amount of alcohol has been found to be safe during pregnancy.  Do not diet or try to lose weight. For example, do not follow a low-carbohydrate diet. If you are overweight at the start of your pregnancy, your doctor will work with you to manage your weight gain.  Tell your doctor about all vitamins and supplements you take.  When should you call for help?  Watch closely for changes in your health, and be sure to contact your doctor if you have any problems.  Where can you learn more?  Go to https://www.[employer].net/patiented  Enter Y785 in the search box to learn more about \"Nutrition During Pregnancy: Care Instructions.\"  Current as of: October 7, 2024  Content Version: 14.5 2024-2025 [employer].   Care instructions adapted under license by your healthcare professional. If you have questions about a medical condition or this instruction, always ask your healthcare professional. [employer] disclaims any warranty or liability for your use of this information.    Exercise During Pregnancy: Care Instructions  Overview     Exercise is good for you during a healthy pregnancy. It can " relieve back pain, swelling, and other discomforts. It also prepares your muscles for childbirth. And exercise can improve your energy level and help you sleep better.  If your doctor advises it, get more exercise. For example, walking is a good choice. Bit by bit, increase the amount you walk every day. Try for at least 30 minutes on most days of the week. You could also try a prenatal exercise class. But if you do not already exercise, be sure to talk with your doctor before you start a new exercise program. Doctors do not recommend contact sports during pregnancy.  Follow-up care is a key part of your treatment and safety. Be sure to make and go to all appointments, and call your doctor if you are having problems. It's also a good idea to know your test results and keep a list of the medicines you take.  How can you care for yourself at home?  Talk with your doctor about the right kind of exercise for each stage of pregnancy.  Listen to your body to know if your exercise is at a safe level.  Do not become overheated while you exercise. High body temperature can be harmful. Avoid activities that can make your body too hot.  If you feel tired, take it easy. You might walk instead of run.  If you are used to strenuous exercise, ask your doctor how to know when it's time to slow down.  If you exercised before getting pregnant, you should be able to keep up your routine early in your pregnancy. Later in your pregnancy, you may want to switch to more gentle activities.  Drink plenty of fluids before, during, and after exercise.  Avoid contact sports, such as soccer and basketball. Also avoid risky activities. These include scuba diving, horseback riding, and exercising at a high altitude (above 6,000 feet). If you live in a place with a high altitude, talk to your doctor about how you can exercise safely.  Do not get overtired while you exercise. You should be able to talk while you work out.  After your fourth month  "of pregnancy, avoid exercises that require you to lie flat on your back on a hard surface. These include sit-ups and some yoga poses.  Get plenty of rest. You may be very tired while you are pregnant.  Where can you learn more?  Go to https://www.[employer].net/patiented  Enter S801 in the search box to learn more about \"Exercise During Pregnancy: Care Instructions.\"  Current as of: April 30, 2024  Content Version: 14.5    9746-5062 [employer].   Care instructions adapted under license by your healthcare professional. If you have questions about a medical condition or this instruction, always ask your healthcare professional. [employer] disclaims any warranty or liability for your use of this information.    Learning About Pregnancy When You Are Underweight or Overweight  How does your weight affect your pregnancy?    The basics of prenatal care are the same for everyone, regardless of size. You'll get what you need to have a healthy baby.  But if you are not at a weight that is healthy for you, it can make a difference in a few things. Being underweight or overweight can increase the chances of some problems during pregnancy. So your doctor or midwife will pay close attention to your health and your baby's health. You may have some extra doctor or midwife visits and tests. And you may have some tests earlier in your pregnancy.  Work with your doctor or midwife to get the care you need. Go to all your doctor or midwife visits, and follow their advice about what to do and what to avoid during pregnancy.  How much weight gain is healthy during pregnancy?  There's no fixed number of pounds that you should be aiming for. Instead, there's a range of weight gain that's good for you and your baby. Based on your weight before pregnancy, experts say it's generally best to gain about:  28 lb (13 kg) to 40 lb (18 kg) if you're underweight.  25 lb (11 kg) to 35 lb (16 kg) if you're at a healthy " "weight.  15 lb (7 kg) to 25 lb (11 kg) if you're overweight.  11 lb (5 kg) to 20 lb (9 kg) if you're very overweight (obese). In some cases, a doctor may recommend that you don't gain any weight.  If you have questions about weight gain during pregnancy, talk with your doctor about what's right for you. Gaining a healthy amount of weight helps you have a healthy pregnancy.  How much extra food do you need to eat?  How much food you need to eat during pregnancy depends on:  Your height.  How much you weigh when you get pregnant.  How active you are.  If you're carrying more than one fetus (multiple pregnancy).  In the first trimester, you'll probably need the same amount of calories as you did before you were pregnant. In general, in your second trimester, you need to eat about 340 extra calories a day. In your third trimester, you need to eat about 450 extra calories a day.  What can you do to have a healthy pregnancy?  The best things you can do for you and your baby are to eat healthy foods, get regular exercise, avoid alcohol and smoking, and go to your doctor or midwife visits.  Eat a variety of foods from all the food groups. Make sure to get enough calcium and folic acid. Ask your doctor or midwife how much folic acid you should be taking.  You may want to work with a dietitian to help you plan healthy meals to get the right amount of calories for you.  Talk to your doctor or midwife about how you can exercise safely. If you didn't exercise much before you got pregnant, talk to your doctor or midwife about how you can slowly get more active. They may want to set up an exercise program with you.  Where can you learn more?  Go to https://www.[employer].net/patiented  Enter B644 in the search box to learn more about \"Learning About Pregnancy When You Are Underweight or Overweight.\"  Current as of: April 30, 2024  Content Version: 14.5    4114-9980 [facility] [employer].   Care instructions adapted under license " by your healthcare professional. If you have questions about a medical condition or this instruction, always ask your healthcare professional. [employer] disclaims any warranty or liability for your use of this information.

## 2025-07-09 NOTE — PROGRESS NOTES
12w0d overall doing ok, starting to feel better.  Had some rough times with nausea and vomiting over the last month, but this week she has felt much improved.  She ran [employer]'s marathon at 9 weeks and felt quite well during the race.  Genetic screening next week and FAS scheduled.  RTC 4 weeks mike

## 2025-07-10 ENCOUNTER — PRE VISIT (OUTPATIENT)
Dept: MATERNAL FETAL MEDICINE | Facility: CLINIC | Age: 35
End: 2025-07-10
Payer: COMMERCIAL

## 2025-07-17 ENCOUNTER — LAB (OUTPATIENT)
Dept: LAB | Facility: CLINIC | Age: 35
End: 2025-07-17
Payer: COMMERCIAL

## 2025-07-17 ENCOUNTER — OFFICE VISIT (OUTPATIENT)
Dept: MATERNAL FETAL MEDICINE | Facility: CLINIC | Age: 35
End: 2025-07-17
Payer: COMMERCIAL

## 2025-07-17 ENCOUNTER — HOSPITAL ENCOUNTER (OUTPATIENT)
Dept: ULTRASOUND IMAGING | Facility: CLINIC | Age: 35
End: 2025-07-17
Payer: COMMERCIAL

## 2025-07-17 DIAGNOSIS — O26.90 PREGNANCY RELATED CONDITION, ANTEPARTUM: ICD-10-CM

## 2025-07-17 DIAGNOSIS — Z36.9 PRENATAL SCREENING ENCOUNTER: ICD-10-CM

## 2025-07-17 DIAGNOSIS — Z36.9 FIRST TRIMESTER SCREENING: Primary | ICD-10-CM

## 2025-07-17 DIAGNOSIS — Z31.430 ENCOUNTER OF FEMALE FOR TESTING FOR GENETIC DISEASE CARRIER STATUS FOR PROCREATIVE MANAGEMENT: ICD-10-CM

## 2025-07-17 DIAGNOSIS — Z36.9 PRENATAL SCREENING ENCOUNTER: Primary | ICD-10-CM

## 2025-07-17 LAB
LAB ORDER RESULT STATUS: NORMAL
Lab: NORMAL
PERFORMING LABORATORY: NORMAL
TEST NAME: NORMAL

## 2025-07-17 PROCEDURE — 76813 OB US NUCHAL MEAS 1 GEST: CPT

## 2025-07-17 PROCEDURE — 999N000069 HC STATISTIC GENETIC COUNSELING, < 16 MIN

## 2025-07-17 PROCEDURE — 36415 COLL VENOUS BLD VENIPUNCTURE: CPT

## 2025-07-17 NOTE — PROGRESS NOTES
Please refer to ultrasound report under 'Imaging' Studies of 'Chart Review' tabs.    Naun Salazar M.D.

## 2025-07-17 NOTE — NURSING NOTE
Pt at [facility] for GC/NT ultrasound.  Pt denies bleeding, cramping, leaking fluid and any other concerns.  Pt planning on NIPT and carrier screening, test kits and map to outpatient lab provided.  SBAR given to MD.

## 2025-07-17 NOTE — PROGRESS NOTES
[facility]  Genetic Counseling Consult    Patient:  Sanjuanita Fry  Preferred Name: Sanjuanita YOB: 1990   Date of Service:  7/17/25   MRN: [id number]    Sanjuanita was seen at the [facility] for genetic consultation. The indication for genetic counseling is desire to discuss options for genetic screening and diagnostics. The patient was accompanied to this visit by their , Bridger.    The session was conducted in English.      IMPRESSION/ PLAN   1. Sanjuanita has not had genetic screening in this pregnancy but elected to have screening today.     2. During today's [facility] visit, Sanjuanita had a blood draw for carrier screening. Results are expected in 2-3 weeks. The patient will be called with results and if they do not answer they requested a detailed message with results on their voicemail.Patient was informed that results will be available in [facility]. Sanjuanita also had a blood draw for expanded non-invasive prenatal testing (also called NIPT, NIPS, or cell-free DNA) through [facility] ([facility]). The expanded NIPT screens for trisomy 21, 18, and 13 and select microdeletion syndromes, including 22q11.2 deletion syndrome. The patient opted to screen for sex chromosome aneuploidies, including reported fetal sex. Results are expected in 7-14 days. The patient will be called with results and if they do not answer they requested a detailed message with results on their voicemail, including the predicted fetal sex information.  Patient was informed that results, including fetal sex, will be available in [facility].    3. Since the patient chose aneuploidy screening via NIPT, quad screen is NOT recommended in the second trimester. If the patient desires screening for open neural tube defects, maternal serum AFP only is recommended, ideally between 16-18 weeks gestation.    4. Sanjuanita had a nuchal translucency ultrasound today. Please  "see the ultrasound report for further details.    5. Further recommendations include a fetal anatomy ultrasound around 18-20 weeks, which will most likely be completed with their primary OB provider.    PREGNANCY HISTORY   /Parity:       CURRENT PREGNANCY   Current Age: 34 year old   Age at Delivery: 35 year old  MARCI: 2026, by Last Menstrual Period                                   Gestational Age: 13w1d  This pregnancy is a single gestation.   This pregnancy was conceived spontaneously.    MEDICAL HISTORY   Sanjuanita s reported medical history is not expected to impact pregnancy management or risks to fetal development.       FAMILY HISTORY   A three-generation pedigree was obtained today and is scanned under the \"Media\" tab in Epic. The family history was reported by Sanjuanita and their partner, Bridger.    The following significant findings were reported today for Sanjuanita's family:   Brother born with polydactyly and no tailbone. Sanjuanita's mom was reportedly taking Bendectin during this pregnancy for nausea, and they suspect this was the cause of his birth defects.  Sister born deaf. She had surgery to \"open her ear tubes\" and since then could hear normally. She had a diagnosis of autism as a child but they believe her delays were due to her deafness. Sanjuanita's sister has not had a genetics evaluation. We discussed that she could pursue an evaluation in order to determine is there is a genetic cause of her hearing loss and autism.    Maternal aunt who passed from pancreatic cancer diagnosed at 57  Maternal aunt with ovarian cancer diagnosed at 30  Maternal aunt with precancerous cells identified in her uterus at 40. This resulted in a hysterectomy.  Maternal female first cousin with spina bifida, blindness, and wheelchair utilization. Sanjuanita does not believe she has seen genetics and is not aware of a genetic diagnosis for this cousin.  Maternal male first cousin with severe autism. This cousin has " also not had a genetics evaluation.  Maternal grandmother passed from kidney disease at 60 and had type II diabetes    Father with basal cell carcinoma and type II diabetes  Paternal first cousin once removed with cystic fibrosis  Paternal grandmother who passed from emphysema and had a history of lung and skin cancer. She reportedly smoked in her youth.    The following significant findings were reported today for Bridger's family:   Mother with kidney stones  Maternal aunt who passed at 2. Bridger is unaware of her cause of death.  Maternal aunt with breast cancer diagnosed in her  50s  Maternal grandmother had 3 miscarriages    Father with type II diabetes, kidney disease, congestive heart disease, and chronic inflammatory demyelinating polyradiculoneuropathy  Paternal great uncle (through grandmother) who passed from pancreatic cancer  Paternal great aunt (through grandmother) who passed from pancreatic cancer    Otherwise, the reported family history is unremarkable for stillbirths and consanguinity.    Birth Defects  Structural differences at birth (also called birth defects) occur in about 3-5% of all pregnancies. Examples within Sanjuanita's family include neural tube defects, extra fingers, missing tailbone, and structural ear differences. Often times isolated birth defects occur randomly in an individual. If there is no other family history of similar structural differences, the recurrence risk is likely low. However, sometimes structural differences may be a feature of an underlying genetic condition. Without performing genetic testing on the affected relative this possibility cannot be completely ruled out.    Spina Bifida  We discussed that neural tube defects such as spina bifida occur when the neural tube does not close completely during the first few weeks of fetal development which can result in abnormalities in spine formation. They affect approximately every 1 in 1500 to 1 in 1000 pregnanies and can  range in severity depending on whether or not the defect is closed and where along the spine the defect is located. Spina bifida occulta is the most mild form and anencephaly and acrania are the most severe forms. The cause of open neural tube defects is generally understood to be multifactorial, including multiple genetic and environmental factors. Due to the genetic component, there is a recurrence risk based on family history.     Hearing Loss  We reviewed that hearing loss is relatively common in the human population in that profound congenital hearing loss is estimated to occur in about 1 in 1000 births. Approximately half of cases of hearing loss are thought to be due to environmental factors (acoustic trauma, ototoxic drug exposure, and bacterial or viral infections such as rubella or cytomegalovirus) and half may be due to an underlying genetic etiology. Genetic causes can be syndromic or nonsyndromic. In the absence of an identified underlying cause, risk assessment is challenging. We discussed option of carrier screening which can include more common genes related to nonsyndromic hearing loss. [place] screen involves a hearing screen and this family history can be shared with the pediatrician.     Autism  Some forms of autism spectrum disorders can be associated with specific genetic conditions, where approximately 15-20% of individuals who have autism will have an identifiable genetic cause for this history.  However, most often these conditions are due to the combination of genes and environmental factors that can affect development.  Since there is a genetic component to autism spectrum disorders, the recurrence risk for other family members is higher among first-degree relatives of the individual with an autism spectrum disorder (full siblings), and decreases with distance in relationship to other second-degree relatives.    Cystic Fibrosis  Cystic fibrosis (CF) is a genetic condition caused by  "mutations in the CFTR gene. The condition is characterized by thick mucus in the lungs and digestive system. CF is a chronic condition and affected individuals typically experience breathing problems and lung infections that worsen over time. Affected individuals often also have pancreatic insufficiency. In men infertility and delayed puberty is common. Severity of symptoms vary for individuals with most cases being diagnosed during childhood or through [place] Screening programs. CF severity is also impacted by the specific changes an individual inherits in the CFTR gene. CF is treated based on symptoms and aims to help with respiratory functions as well as enzymes used to help pancreatic function for proper digestion. There is reduced life expectancy for CF, but children born with CF are currently living longer than in the past due to medical advances. Due to Sanjuanita's family history of cystic fibrosis, we discussed that she is at an increased risk to be a carrier. Her risk is 1 in 8. Importantly, both Sanjuanita and Bridger are pursing carrier screening. Please see the \"Risk Assessment for Inherited Conditions and Carrier Screening Options\" section below for more information.    Cancer  We discussed how most cancer seen in families occurs sporadically, but about 5-10% may be due to an underlying genetic etiology. We discussed that cancer diagnosed under the age of 50 raises suspicion for a potential hereditary cancer syndrome. Other characteristics that may suggest a hereditary cancer syndrome include cancer in 2 or more close relatives on the same side of the family, multiple primary tumors, bilateral or multiple rare cancers, constellations of tumors associated with a specific cancer syndrome, and evidence of autosomal dominant transmission.     Sanjuanita's family history is suggestive of a hereditary cancer syndrome. Sanjuanita has an appointment scheduled with a cancer genetic counselor on 2025. Sanjuanita was " "encouraged to share cancer family history with their health care providers. Even if no hereditary cancer syndrome is identified in a family, individuals may be eligibile for increased screening or risk management options given a family history.     Additionally, I encouraged Bridger to share with providers his family history of breast and pancreatic cancer. He could also consider meeting with a cancer genetic counselor.  If a family wants more information, they can contact the [facility] Cancer Risk Management Program ([phone]). Physicians can also make referrals at [email or web address] or, if within the [place] system, through [facility] referral for \"Cancer Risk Mgmt/Cancer Genetic Counseling\"     Recurrent Pregnancy Loss  At least 10-15% of the recognized pregnancies end in miscarriage, with most losses occurring in the first trimester. The rate of miscarriage less than 8 weeks gestation may be even greater as some women may not recognize that they are pregnant. Around half of miscarriages that occur before 20 weeks gestation are caused by chromosome abnormalities.For people who have experienced three or more unexplained pregnancy losses, it has been estimated that around 2-5% of these couples have this history related to a chromosome in one of the partners. Bridger's maternal grandmother had 3 miscarriages with unknown causes. If Parish were to have future losses, we could consider a further genetic workup.       RISK ASSESSMENT FOR INHERITED CONDITIONS AND CARRIER SCREENING OPTIONS   Expanded carrier screening is available to screen for autosomal recessive conditions and X-linked conditions in a large list of genes. Carrier screening does not test the pregnancy but gives a risk assessment for the pregnancy and future pregnancies to have the condition. Expanded carrier screening is designed to identify carrier status for conditions that are " primarily childhood or adolescent onset. Expanded carrier screening does not evaluate for adult-onset conditions such as hereditary cancer syndromes, dementia/ Alzheimer's disease, or cardiovascular disease risk factors. Additionally, expanded carrier screening is not comprehensive for all known genetic diseases or inherited conditions. Carrier screening does not test for all genetic and health conditions or risk factors.     Autosomal recessive conditions happen when a mutation has been inherited from the egg and sperm and include conditions like cystic fibrosis, thalassemia, hearing loss, spinal muscular atrophy, and more. We reviewed that when both biological parents carry a harmful genetic change in a gene associated with autosomal recessive inheritance, each of their pregnancies has a 1 in 4 (25%) chance to be affected by that condition. X-linked conditions happen when a mutation has been inherited from the egg and include conditions like fragile X syndrome.With x-linked conditions, the specific risk generally depends on the chromosomal sex of the fetus, with XY individuals (generally male) being most severely affected.     [place] screening was not reviewed due to focus on other topics.  About [place] Screening    The patient does have a family history of a known inherited condition. Sanjuanita's first cousin once removed has cystic fibrosis. This means that Sanjuanita's chance to be a carrier is 1 in 8. Based on [place] population carrier frequency for cystic fibrosis, the risk for Bridger to be a carrier is 1 in 25, meaning that the chance for each child they have together to be affected with cystic fibrosis is 1 in 800 or 0.125%. See description of cystic fibrosis below for reference.    Cystic Fibrosis  Cystic Fibrosis (CF) is an inherited condition characterized by the production of abnormally thick, sticky mucus, particularly in the lungs and digestive system. It is caused by having at least two harmful  genetic changes in the CFTR gene. Normally, the job of mucus is to protect and lubricate different organs. However, the thickened mucus produced by individuals with CF blocks and clogs various systems in the body. Individuals with CF can have a variety of symptoms ranging from mild to severe.     The severity of CF symptoms can generally be predicted by the specific genetic changes in the CFTR gene that an individual inherits, although the symptoms may vary somewhat from person to person (even among those with the same genetic changes). There are certain CFTR genetic changes that are associated with a broad spectrum of disease, which means that the symptom severity can vary significantly between individuals with the same genetic changes and is difficult to predict. Some individuals with these particular genetic changes have severe CF, while others might have mild CF, a CFTR-related disorder (CFTR-RD), or even no symptoms at all.     Cystic Fibrosis  CF is usually diagnosed in infancy or early childhood. Severe forms of CF will typically involve multiple body parts including the lungs, pancreas, liver, and reproductive organs. Most individuals with CF experience breathing problems and frequent lung infections that lead to permanent lung damage such as scarring (fibrosis) and sac-like growths (cysts). The pancreas, an organ that produces important substances that help regulate blood sugar (insulin) and break down food (digestive enzymes), is often affected in individuals with severe CF. This prevents the body from digesting food properly resulting in diarrhea, malnutrition, and poor growth. Individuals with CF may develop CF-related diabetes as they age. Delayed puberty is also common among people with CF. Most men with the disorder have infertility due to congenital absence of the vas deferens (CAVD), a condition in which the vas deferens (a reproductive organ involved in sperm transport) fails to develop properly.  "    CFTR-Related Disorders  A few individuals with two harmful genetic changes in the CFTR gene have some health problems associated with CF, but are not formally diagnosed with the condition. These individuals are considered to have a CFTR-related disorder, which can include symptoms affecting only one body part (such as only the lungs or only the pancreas). Men may have CAVD causing infertility as the only symptom.    Additional Considerations for Carriers  Most carriers do not experience any symptoms of CF. Rarely, carriers have symptoms of a CFTR-related disorder    Treatment  FDA approved medications are available for individuals with certain genetic changes in the CFTR gene. There are also many other options for treating the symptoms in everyone with CF, regardless of the genetic changes that are present. Because thick mucus can build up in the respiratory system, it is important to keep the patient's airways open to ease breathing and prevent infection. This can be accomplished with various prescription drugs as well as by physically loosening mucus by pounding on the patient's back in a specific way. This treatment, known as \"postural drainage and chest percussion,\" can be performed by the affected person using a special vest or by someone other than the affected person, and is typically done at least once daily. As respiratory infections occur, doctors typically prescribe antibiotics.   Healthcare providers will also monitor the digestive system to ensure that the patient is getting proper nutrition. Medications such as enzymes or vitamin supplements may be prescribed. Both the respiratory and digestive systems of an individual with CF must be monitored regularly by a medical team.   Some men with CAVD may be able to have children with the use of assisted reproductive technologies.   Surgery may be needed to correct certain problems caused by CF, and lung transplant is an option for some " individuals.    Prognosis  Individuals with CF have shortened lifespans. However, thanks to improved treatments and a better understanding of the condition, average life expectancy has been increasing over time. Many children who are born with CF today and receive proper treatment are expected to live into their fifties or sixties.    The patient nor their partner have had carrier screening previously.     The patient and their partner pursued carrier screening today. The screening will include 613 conditions for Sanjuanita ad 560 conditions for Bridger (same panel as Sanjuanita minus X-linked conditions) through [facility]. See separate documentation in partners chart.     RISK ASSESSMENT FOR CHROMOSOME CONDITIONS   We explained that the risk for fetal chromosome abnormalities increases with maternal age. We discussed specific features of common chromosome abnormalities, including trisomy 21 (Down syndrome), trisomy 13, trisomy 18, and sex chromosome trisomies.    At age 35 at midtrimester, the risk to have a baby with Down syndrome is 1 in 421.   At age 35 at midtrimester, the risk to have a baby with any chromosome abnormality is 1 in 217.  At age 35 at delivery, the risk to have a baby with Down syndrome is 1 in 625.   At age 35 at delivery, the risk to have a baby with any chromosome abnormality is 1 in 312.     Sanjuanita has not had genetic screening in this pregnancy but elected to have screening today.      GENETIC TESTING OPTIONS   Genetic testing during a pregnancy includes screening and diagnostic procedures.      Screening tests are non-invasive which means no risk to the pregnancy and includes ultrasounds and blood work. The benefits and limitations of screening were reviewed. Screening tests provide a risk assessment (chance) specific to the pregnancy for certain fetal chromosome abnormalities but cannot definitively diagnose or exclude a fetal chromosome abnormality. Follow-up genetic counseling and  consideration of diagnostic testing is recommended with any abnormal screening result. Diagnostic testing during a pregnancy is more certain and can test for more conditions. However, the tests do have a risk of miscarriage that requires careful consideration. These tests can detect fetal chromosome abnormalities with greater than 99% certainty. Results can be compromised by maternal cell contamination or mosaicism and are limited by the resolution of current genetic testing technology.     There is no screening or diagnostic test that detects all forms of birth defects or intellectual disability.     We discussed the following screening options:     Non-invasive prenatal testing (NIPT)  Also called cell-free DNA screening because it detects chromosomes from the placenta in the pregnant person's blood  Can be done any time after 10 weeks gestation  Standard recommendation for NIPT screens for trisomy 21, trisomy 18, trisomy 13, with the option of adding sex chromosome aneuploidies, without or without predicted sex  Cannot screen for open neural tube defects, maternal serum AFP after 15 weeks is recommended  New NIPT options include screening for other trisomies, microdeletion syndromes, and in some cases fetal blood antigens. Guidelines do not recommend these conditions are included in standard screening. These options have limitations and should be discussed with a genetic counselor.   However, current (2023) ACMG guidelines do recommend that screening for one microdeletion syndrome, called 22q11.2 deletion syndrome be offered to all pregnant patients. 22q11.2 deletion syndrome has an estimated prevalence of 1 in 990 to 1 in 2148 (0.05-0.1%). Risk is not thought to increase with maternal age. Clinical features are variable but include congenital heart defects, cleft palate, developmental delays, immune system deficiencies, and hearing loss. Approximately 90% of cases are de nydia (a sporadic new change in a  pregnancy). Cell-free DNA screening for 22q11.2 deletion syndrome is available with the inclusion of other microdeletion syndromes. There is less data about the performance of cell-free DNA screening for more rare microdeletions and the chance for false positives or negative may be increased.  We discussed the limitations of cell-free DNA screening in detecting microdeletions and the possibility of false positives and false negatives. The patient opted into microdeletion syndrome screening.     We discussed the following ultrasound options:    Nuchal translucency (NT) ultrasound  Ultrasound between [id number] that includes nuchal translucency measurement and nasal bone assessments  Nuchal translucency refers to the space at the back of the neck where fluid builds up. All babies at this stage have fluid and there is only concern if there is too much fluid  Nasal bone refers to the small bone in the nose. There is concern for conditions like Down syndrome if the bone cannot be seen at all  This ultrasound can be done as part of first trimester screening, at the same time as another screen (NIPT), at the same time as a CVS, or if the patients does not want genetic screening.  Markers on ultrasound detects about 70% of pregnancies with aneuploidy  Abnormalities on NT ultrasound can also increase the risk for a birth defect, like a heart defect    Comprehensive level II ultrasound (Fetal Anatomy Ultrasound)  Ultrasound done between 18-20 weeks gestation  Screens for major birth defects and markers for aneuploidy (like trisomy 21 and trisomy 18)  Includes looking at the fetus/baby's growth, heart, organs (stomach, kidneys), placenta, and amniotic fluid    We discussed the following diagnostic options:     Chorionic villus sampling (CVS)  Invasive diagnostic procedure done between 10w0d and 13w6d  The procedure collects a small sample from the placenta for the purpose of chromosomal testing and/or other genetic  testing  Diagnostic result; more than 99% sensitivity for fetal chromosome abnormalities  Cannot screen for open neural tube defects, maternal serum AFP after 15 weeks is recommended    Amniocentesis  Invasive diagnostic procedure done after 15 weeks gestation  The procedure collects a small sample of amniotic fluid for the purpose of chromosomal testing and/or other genetic testing  Diagnostic result; more than 99% sensitivity for fetal chromosome abnormalities  Testing for AFP in the amniotic fluid can test for open neural tube defects    It was a pleasure to be involved with Sanjuanita s care. I spent 60 minutes on the date of the encounter doing chart review, obtaining history, test coordination, documentation, and further activities as noted above.    Virginia Steen MS  Genetic Counseling Intern  [facility]  Maternal Fetal Medicine  [email or web address]  [email or web address]  Office: [phone]  [facility]: [phone]   Fax: [phone]      Patient evaluated and discussed with the genetic counseling intern. I called the patient after her appointment to answer any further questions. I have verified the content of the note, which accurately reflects my assessment of the patient and the plan of care.    Supervising Genetic Counselor  Liat Shell MS, [facility]  Maternal Fetal Medicine  [email or web address]  [phone]

## 2025-07-23 ENCOUNTER — TELEPHONE (OUTPATIENT)
Facility: CLINIC | Age: 35
End: 2025-07-23
Payer: COMMERCIAL

## 2025-07-23 ENCOUNTER — TELEPHONE (OUTPATIENT)
Dept: MATERNAL FETAL MEDICINE | Facility: CLINIC | Age: 35
End: 2025-07-23
Payer: COMMERCIAL

## 2025-07-23 NOTE — TELEPHONE ENCOUNTER
July 23, 2025    I spoke with Sanjuanita regarding her non-invasive prenatal test (NIPT) results.     Results indicate NO ANEUPLOIDY DETECTED for chromosomes 21, 18, 13, or the sex chromosomes (XY). Results were also also screen negative for microdeletions (1p36, 4p, 5p, 15q11.2, and 22q11.2).    This puts her current pregnancy at low risk for Down syndrome, trisomy 18, trisomy 13 and sex chromosome abnormalities, and the above listed microdeletion conditions. This test is reported to have the following sensitivities: Down syndrome: 99.7%, trisomy 18: 97.9%, and trisomy 13: 99.0%. Although these results are reassuring, this does not replace a standard chromosome analysis from a chorionic villus sampling or amniocentesis.     MSAFP is the appropriate second trimester screening test for open neural tube defects; the maternal quad screen is not recommended.    Her results will be made available in her Epic chart for her primary OB to review.       Virginia Steen MS  Genetic Counseling Intern  [facility]  Maternal Fetal Medicine  [email or web address]  [email or web address]  Office: [phone]  [facility]: [phone]   Fax: [phone]

## 2025-07-28 LAB — SCANNED LAB RESULT: NORMAL

## 2025-08-10 ENCOUNTER — HEALTH MAINTENANCE LETTER (OUTPATIENT)
Age: 35
End: 2025-08-10

## 2025-08-13 ENCOUNTER — PRENATAL OFFICE VISIT (OUTPATIENT)
Dept: OBGYN | Facility: CLINIC | Age: 35
End: 2025-08-13
Payer: COMMERCIAL

## 2025-08-13 ENCOUNTER — TRANSFERRED RECORDS (OUTPATIENT)
Dept: OBGYN | Facility: CLINIC | Age: 35
End: 2025-08-13

## 2025-08-13 VITALS
HEART RATE: 80 BPM | DIASTOLIC BLOOD PRESSURE: 62 MMHG | BODY MASS INDEX: 25.46 KG/M2 | SYSTOLIC BLOOD PRESSURE: 121 MMHG | WEIGHT: 143.7 LBS

## 2025-08-13 DIAGNOSIS — Z34.02 ENCOUNTER FOR SUPERVISION OF NORMAL FIRST PREGNANCY, SECOND TRIMESTER: Primary | ICD-10-CM

## 2025-08-13 PROCEDURE — 3074F SYST BP LT 130 MM HG: CPT | Performed by: OBSTETRICS & GYNECOLOGY

## 2025-08-13 PROCEDURE — 3078F DIAST BP <80 MM HG: CPT | Performed by: OBSTETRICS & GYNECOLOGY

## 2025-08-13 PROCEDURE — 36415 COLL VENOUS BLD VENIPUNCTURE: CPT | Performed by: OBSTETRICS & GYNECOLOGY

## 2025-08-13 PROCEDURE — 82105 ALPHA-FETOPROTEIN SERUM: CPT | Mod: 90 | Performed by: OBSTETRICS & GYNECOLOGY

## 2025-08-13 PROCEDURE — 99207 PR PRENATAL VISIT: CPT | Performed by: OBSTETRICS & GYNECOLOGY

## 2025-08-13 PROCEDURE — 0502F SUBSEQUENT PRENATAL CARE: CPT | Performed by: OBSTETRICS & GYNECOLOGY

## 2025-08-13 PROCEDURE — 99000 SPECIMEN HANDLING OFFICE-LAB: CPT | Performed by: OBSTETRICS & GYNECOLOGY

## 2025-08-19 ENCOUNTER — MYC MEDICAL ADVICE (OUTPATIENT)
Dept: OBGYN | Facility: CLINIC | Age: 35
End: 2025-08-19
Payer: COMMERCIAL

## 2025-08-20 LAB
# FETUSES US: NORMAL
AFP MOM SERPL: 1.46
AFP SERPL-MCNC: 59 NG/ML
AGE - REPORTED: 33.4 YR
CURRENT SMOKER: NO
FAMILY MEMBER DISEASES HX: NO
GA METHOD: NORMAL
GA: NORMAL WK
IDDM PATIENT QL: NO
INTEGRATED SCN PATIENT-IMP: NORMAL
SPECIMEN DRAWN SERPL: NORMAL

## 2025-09-02 ENCOUNTER — ANCILLARY PROCEDURE (OUTPATIENT)
Dept: ULTRASOUND IMAGING | Facility: CLINIC | Age: 35
End: 2025-09-02
Payer: COMMERCIAL

## 2025-09-02 ENCOUNTER — PRENATAL OFFICE VISIT (OUTPATIENT)
Dept: OBGYN | Facility: CLINIC | Age: 35
End: 2025-09-02
Payer: COMMERCIAL

## 2025-09-02 VITALS
SYSTOLIC BLOOD PRESSURE: 115 MMHG | WEIGHT: 150.1 LBS | DIASTOLIC BLOOD PRESSURE: 66 MMHG | BODY MASS INDEX: 26.6 KG/M2 | OXYGEN SATURATION: 98 % | HEART RATE: 80 BPM

## 2025-09-02 DIAGNOSIS — Z34.91 NORMAL PREGNANCY, FIRST TRIMESTER: ICD-10-CM

## 2025-09-02 DIAGNOSIS — Z34.00 SUPERVISION OF NORMAL FIRST PREGNANCY, ANTEPARTUM: Primary | ICD-10-CM

## 2025-09-02 PROCEDURE — 0502F SUBSEQUENT PRENATAL CARE: CPT | Performed by: OBSTETRICS & GYNECOLOGY

## 2025-09-02 PROCEDURE — 99207 PR PRENATAL VISIT: CPT | Performed by: OBSTETRICS & GYNECOLOGY

## 2025-09-02 PROCEDURE — 3074F SYST BP LT 130 MM HG: CPT | Performed by: OBSTETRICS & GYNECOLOGY

## 2025-09-02 PROCEDURE — 3078F DIAST BP <80 MM HG: CPT | Performed by: OBSTETRICS & GYNECOLOGY
